# Patient Record
Sex: MALE | Race: WHITE | NOT HISPANIC OR LATINO | Employment: FULL TIME | URBAN - METROPOLITAN AREA
[De-identification: names, ages, dates, MRNs, and addresses within clinical notes are randomized per-mention and may not be internally consistent; named-entity substitution may affect disease eponyms.]

---

## 2017-01-30 ENCOUNTER — OFFICE VISIT (OUTPATIENT)
Dept: PEDIATRICS | Facility: CLINIC | Age: 57
End: 2017-01-30
Payer: COMMERCIAL

## 2017-01-30 VITALS
BODY MASS INDEX: 34.43 KG/M2 | WEIGHT: 227.19 LBS | SYSTOLIC BLOOD PRESSURE: 136 MMHG | HEART RATE: 72 BPM | TEMPERATURE: 97.6 F | OXYGEN SATURATION: 96 % | HEIGHT: 68 IN | RESPIRATION RATE: 12 BRPM | DIASTOLIC BLOOD PRESSURE: 80 MMHG

## 2017-01-30 DIAGNOSIS — H72.91 PERFORATED TYMPANIC MEMBRANE, RIGHT: Primary | ICD-10-CM

## 2017-01-30 DIAGNOSIS — H90.2 CONDUCTIVE HEARING LOSS, UNILATERAL: ICD-10-CM

## 2017-01-30 PROCEDURE — 99213 OFFICE O/P EST LOW 20 MIN: CPT | Performed by: INTERNAL MEDICINE

## 2017-01-30 NOTE — PROGRESS NOTES
"  SUBJECTIVE:                                                    Pavel Greenwood is a 56 year old male who presents to clinic today for the following health issues:      Ear Infection       Duration: x1 1/2 weeks most recent episode but pt states \" has had a perforated ear drum for 20 year\"     Description (location/character/radiation): Right Ear     Intensity:  mild    Accompanying signs and symptoms: None     History (similar episodes/previous evaluation): Yes - pt went to [facility] clinic x1 week ago suggested he should come in.     Precipitating or alleviating factors: None    Therapies tried and outcome: None       Ace comes in for follow-up. He was seen in the [facility] about 2 weeks ago and diagnosed with a R ear infection and started on antibiotics. He was told to follow-up with his PCP because his TM was noted to be ruptured. 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 He elected to use a hearing aid but is wondering if there are other options available.    Problem list and histories reviewed & adjusted, as indicated.  Additional history: as documented    Problem list, Medication list, Allergies, and Medical/Social/Surgical histories reviewed in [facility] and updated as appropriate.    ROS:  Constitutional, HEENT systems are negative, except as otherwise noted.    OBJECTIVE:                                                    /80 mmHg  Pulse 72  Temp(Src) 97.6  F (36.4  C) (Oral)  Resp 12  Ht 5' 8\" (1.727 m)  Wt 227 lb 3 oz (103.052 kg)  BMI 34.55 kg/m2  SpO2 96%  Body mass index is 34.55 kg/(m^2).  GENERAL: healthy, alert and no distress  HENT: normal cephalic/atraumatic, right ear: occluded with debris, some of this was removed with llit ear speculum but not able to fully visualize TM.      ASSESSMENT/PLAN:                                                  "     1. Perforated tympanic membrane, right  Appears to have significant drainage from R middle ear, consistent with TM perforation and likely resolving infection. Will refer patient to ENT to determine next best treatment options.   - OTOLARYNGOLOGY REFERRAL    2. Conductive hearing loss, unilateral  As above.   - OTOLARYNGOLOGY REFERRAL    Patient Instructions   Follow-up with Dr. Sandhu to discuss further management and if surgery would be a better option.        Janet Mueller MD  [facility]

## 2017-01-30 NOTE — NURSING NOTE
"Chief Complaint   Patient presents with     Ear Problem       Initial /80 mmHg  Pulse 72  Temp(Src) 97.6  F (36.4  C) (Oral)  Resp 12  Ht 5' 8\" (1.727 m)  Wt 227 lb 3 oz (103.052 kg)  BMI 34.55 kg/m2  SpO2 96% Estimated body mass index is 34.55 kg/(m^2) as calculated from the following:    Height as of this encounter: 5' 8\" (1.727 m).    Weight as of this encounter: 227 lb 3 oz (103.052 kg).  BP completed using cuff size: mike Cramer MA 1/30/2017 1:15 PM    "

## 2017-01-30 NOTE — MR AVS SNAPSHOT
After Visit Summary   1/30/2017    Pavel Greenwood    MRN: [id number]           Patient Information     Date Of Birth          1960        Visit Information        Provider Department      1/30/2017 1:30 PM Janet Mayorga MD [facility] [place]        Today's Diagnoses     Perforated tympanic membrane, right    -  1     Conductive hearing loss, unilateral           Care Instructions    Follow-up with Dr. Sandhu to discuss further management and if surgery would be a better option.        Follow-ups after your visit        Additional Services     OTOLARYNGOLOGY REFERRAL       Your provider has referred you to: [facility]: [facility] [phone]   [email or web address]    Please be aware that coverage of these services is subject to the terms and limitations of your health insurance plan.  Call member services at your health plan with any benefit or coverage questions.      Please bring the following with you to your appointment:    (1) Any X-Rays, CTs or MRIs which have been performed.  Contact the facility where they were done to arrange for  prior to your scheduled appointment.   (2) List of current medications  (3) This referral request   (4) Any documents/labs given to you for this referral                  Who to contact     If you have questions or need follow up information about today's clinic visit or your schedule please contact [facility] directly at [phone].  Normal or non-critical lab and imaging results will be communicated to you by MyChart, letter or phone within 4 business days after the clinic has received the results. If you do not hear from us within 7 days, please contact the clinic through MyChart or phone. If you have a critical or abnormal lab result, we will notify you by phone as soon as possible.  Submit refill requests through [employer] or call your pharmacy and they will forward the refill request to us.  "Please allow 3 business days for your refill to be completed.          Additional Information About Your Visit        [employer] Information     OnePIN gives you secure access to your electronic health record. If you see a primary care provider, you can also send messages to your care team and make appointments. If you have questions, please call your primary care clinic.  If you do not have a primary care provider, please call [phone] and they will assist you.        Care EveryWhere ID     This is your Care EveryWhere ID. This could be used by other organizations to access your [place] medical records  [id number]        Your Vitals Were     Pulse Temperature Respirations Height BMI (Body Mass Index) Pulse Oximetry    72 97.6  F (36.4  C) (Oral) 12 5' 8\" (1.727 m) 34.55 kg/m2 96%       Blood Pressure from Last 3 Encounters:   01/30/17 136/80   11/15/16 120/84   09/07/16 124/88    Weight from Last 3 Encounters:   01/30/17 227 lb 3 oz (103.052 kg)   11/15/16 219 lb (99.338 kg)   09/07/16 212 lb (96.163 kg)              We Performed the Following     OTOLARYNGOLOGY REFERRAL        Primary Care Provider Office Phone # Fax #    Janet Mayorga [phone]       [facility] [address] [place] [address]        Thank you!     Thank you for choosing [facility]  for your care. Our goal is always to provide you with excellent care. Hearing back from our patients is one way we can continue to improve our services. Please take a few minutes to complete the written survey that you may receive in the mail after your visit with us. Thank you!             Your Updated Medication List - Protect others around you: Learn how to safely use, store and throw away your medicines at www.disposemymeds.org.          This list is accurate as of: 1/30/17  1:48 PM.  Always use your most recent med list.                   Brand Name Dispense Instructions for use    colchicine 0.6 MG tablet    " COLCRYS    30 tablet    Take 2 tablets at the first sign of flare, take 1 additional tablet one hour later.

## 2017-11-10 ENCOUNTER — OFFICE VISIT (OUTPATIENT)
Dept: URGENT CARE | Facility: URGENT CARE | Age: 57
End: 2017-11-10
Payer: COMMERCIAL

## 2017-11-10 VITALS
WEIGHT: 232 LBS | SYSTOLIC BLOOD PRESSURE: 132 MMHG | HEART RATE: 64 BPM | RESPIRATION RATE: 12 BRPM | BODY MASS INDEX: 35.28 KG/M2 | TEMPERATURE: 97.6 F | DIASTOLIC BLOOD PRESSURE: 80 MMHG | OXYGEN SATURATION: 95 %

## 2017-11-10 DIAGNOSIS — M10.9 ACUTE GOUTY ARTHROPATHY: Primary | ICD-10-CM

## 2017-11-10 PROCEDURE — 99213 OFFICE O/P EST LOW 20 MIN: CPT | Performed by: FAMILY MEDICINE

## 2017-11-10 RX ORDER — INDOMETHACIN 25 MG/1
25 CAPSULE ORAL 2 TIMES DAILY WITH MEALS
Qty: 42 CAPSULE | Refills: 1 | Status: SHIPPED | OUTPATIENT
Start: 2017-11-10 | End: 2018-08-18

## 2017-11-10 RX ORDER — ALLOPURINOL 100 MG/1
100 TABLET ORAL DAILY
Qty: 30 TABLET | Refills: 1 | Status: SHIPPED | OUTPATIENT
Start: 2017-11-10 | End: 2018-04-26

## 2017-11-10 NOTE — PROGRESS NOTES
SUBJECTIVE:  Pavel Greenwood, a 57 year old male scheduled an appointment to discuss the following issues:    Acute gouty arthropathy; previous history of gouty arthritis. States he's been on allopurinol in the past.    This episode over the last several days hard to work yesterday he is on his feet most of the day. He works as a .    No injury no fever no chills    Medical, social, surgical, and family histories reviewed.    ROS:  C: NEGATIVE for fever, chills  E: NEGATIVE for vision changes   R: NEGATIVE for significant cough or SOB  CV: NEGATIVE for chest pain, palpitations   GI: NEGATIVE for nausea, abdominal pain, heartburn, or change in bowel habits  : NEGATIVE for frequency, dysuria, or hematuria  MUSCULOSKELETAL: As above  N: NEGATIVE for weakness, dizziness or paresthesias or headache    OBJECTIVE:  /80 (BP Location: Right arm, Patient Position: Sitting, Cuff Size: Adult Regular)  Pulse 64  Temp 97.6  F (36.4  C) (Oral)  Resp 12  Wt 232 lb (105.2 kg)  SpO2 95%  BMI 35.28 kg/m2  EXAM:  GENERAL APPEARANCE: alert and mild distress  EYES: EOMI,  PERRL  HENT: ear canals and TM's normal and nose and mouth without ulcers or lesions  RESP: lungs clear to auscultation - no rales, rhonchi or wheezes  CV: regular rates and rhythm, normal S1 S2, no S3 or S4 and no murmur, click or rub -  ABDOMEN:  soft, nontender, no HSM or masses and bowel sounds normal  MS: Left great toe erythematous at the MTP. Tender to palpation and there is decreased range of motion secondary to pain.    ASSESSMENT/PLAN:  (M10.9) Acute gouty arthropathy  (primary encounter diagnosis)  Comment: This appears to be an acute onset of his gout. He has been on allopurinol but we discussed that this medication is usually a long-term type medication. I will put him back on his allopurinol at a low-dose and he can follow-up with his primary care.    In addition we discussed indomethacin and we'll place him on indomethacin.  Also discussed prednisone which has been used for gout no pain pills dispensed. He will take today off elevated ice and take his medication  Plan: indomethacin (INDOCIN) 25 MG capsule,         allopurinol (ZYLOPRIM) 100 MG tablet

## 2017-11-10 NOTE — MR AVS SNAPSHOT
After Visit Summary   11/10/2017    Pavel Greenwood    MRN: [id number]           Patient Information     Date Of Birth          1960        Visit Information        Provider Department      11/10/2017 9:15 AM Nirav Eisenberg MD [facility]        Today's Diagnoses     Acute gouty arthropathy    -  1       Follow-ups after your visit        Who to contact     If you have questions or need follow up information about today's clinic visit or your schedule please contact [facility] directly at [phone].  Normal or non-critical lab and imaging results will be communicated to you by CEDUhart, letter or phone within 4 business days after the clinic has received the results. If you do not hear from us within 7 days, please contact the clinic through "[employer]t or phone. If you have a critical or abnormal lab result, we will notify you by phone as soon as possible.  Submit refill requests through SmashChart or call your pharmacy and they will forward the refill request to us. Please allow 3 business days for your refill to be completed.          Additional Information About Your Visit        MyChart Information     SmashChart gives you secure access to your electronic health record. If you see a primary care provider, you can also send messages to your care team and make appointments. If you have questions, please call your primary care clinic.  If you do not have a primary care provider, please call [phone] and they will assist you.        Care EveryWhere ID     This is your Care EveryWhere ID. This could be used by other organizations to access your [place] medical records  [id number]        Your Vitals Were     Pulse Temperature Respirations Pulse Oximetry BMI (Body Mass Index)       64 97.6  F (36.4  C) (Oral) 12 95% 35.28 kg/m2        Blood Pressure from Last 3 Encounters:   11/10/17 132/80   01/30/17 136/80   11/15/16 120/84    Weight from Last 3 Encounters:   11/10/17  232 lb (105.2 kg)   01/30/17 227 lb 3 oz (103.1 kg)   11/15/16 219 lb (99.3 kg)              Today, you had the following     No orders found for display         Today's Medication Changes          These changes are accurate as of: 11/10/17  9:36 AM.  If you have any questions, ask your nurse or doctor.               Start taking these medicines.        Dose/Directions    allopurinol 100 MG tablet   Commonly known as:  ZYLOPRIM   Used for:  Acute gouty arthropathy   Started by:  Nirav Eisenberg MD        Dose:  100 mg   Take 1 tablet (100 mg) by mouth daily   Quantity:  30 tablet   Refills:  1       indomethacin 25 MG capsule   Commonly known as:  INDOCIN   Used for:  Acute gouty arthropathy   Started by:  Nirav Eisenberg MD        Dose:  25 mg   Take 1 capsule (25 mg) by mouth 2 times daily (with meals)   Quantity:  42 capsule   Refills:  1            Where to get your medicines      These medications were sent to [facility] - [address]   [address], [place] [address]     Phone:  [phone]     allopurinol 100 MG tablet    indomethacin 25 MG capsule                Primary Care Provider Office Phone # Fax #    Janet Mayorga [phone]       [facility] [place] [address]        Equal Access to Services     [facility]: Hadii richard ku hadasho Soomaali, waaxda luqadaha, qaybta kaalmada adeegyada, atif garvinin hayemilie parra . So [facility] [phone].    ATENCIÓN: Si habla español, tiene a cooper disposición servicios gratuitos de asistencia lingüística. Llame al [phone].    We comply with applicable federal civil rights laws and [place] laws. We do not discriminate on the basis of race, color, national origin, age, disability, sex, sexual orientation, or gender identity.            Thank you!     [facility]  for your care. Our goal is always to provide you with  excellent care. Hearing back from our patients is one way we can continue to improve our services. Please take a few minutes to complete the written survey that you may receive in the mail after your visit with us. Thank you!             Your Updated Medication List - Protect others around you: Learn how to safely use, store and throw away your medicines at www.disposemymeds.org.          This list is accurate as of: 11/10/17  9:36 AM.  Always use your most recent med list.                   Brand Name Dispense Instructions for use Diagnosis    allopurinol 100 MG tablet    ZYLOPRIM    30 tablet    Take 1 tablet (100 mg) by mouth daily    Acute gouty arthropathy       colchicine 0.6 MG tablet    COLCRYS    30 tablet    Take 2 tablets at the first sign of flare, take 1 additional tablet one hour later.    Idiopathic gout involving toe, unspecified chronicity, unspecified laterality       indomethacin 25 MG capsule    INDOCIN    42 capsule    Take 1 capsule (25 mg) by mouth 2 times daily (with meals)    Acute gouty arthropathy

## 2017-11-10 NOTE — NURSING NOTE
"Chief Complaint   Patient presents with     Urgent Care     Arthritis     Flare up of gout in left foot.  Pain started about 2 dys ago and wasnt sure what it was till last night.     Initial /80 (BP Location: Right arm, Patient Position: Sitting, Cuff Size: Adult Regular)  Pulse 64  Temp 97.6  F (36.4  C) (Oral)  Resp 12  Wt 232 lb (105.2 kg)  SpO2 95%  BMI 35.28 kg/m2 Estimated body mass index is 35.28 kg/(m^2) as calculated from the following:    Height as of 1/30/17: 5' 8\" (1.727 m).    Weight as of this encounter: 232 lb (105.2 kg)..  BP completed using cuff size: regular  Nicole Garcia R.N.    "

## 2018-04-26 ENCOUNTER — OFFICE VISIT (OUTPATIENT)
Dept: PEDIATRICS | Facility: CLINIC | Age: 58
End: 2018-04-26
Payer: COMMERCIAL

## 2018-04-26 VITALS
OXYGEN SATURATION: 96 % | BODY MASS INDEX: 34.14 KG/M2 | HEIGHT: 69 IN | DIASTOLIC BLOOD PRESSURE: 76 MMHG | WEIGHT: 230.5 LBS | TEMPERATURE: 98.1 F | HEART RATE: 68 BPM | SYSTOLIC BLOOD PRESSURE: 132 MMHG

## 2018-04-26 DIAGNOSIS — Z86.69 HISTORY OF RECURRENT EAR INFECTION: ICD-10-CM

## 2018-04-26 DIAGNOSIS — H72.91 PERFORATION OF RIGHT TYMPANIC MEMBRANE: ICD-10-CM

## 2018-04-26 DIAGNOSIS — M10.071 ACUTE IDIOPATHIC GOUT INVOLVING TOE OF RIGHT FOOT: Primary | ICD-10-CM

## 2018-04-26 PROCEDURE — 99214 OFFICE O/P EST MOD 30 MIN: CPT | Performed by: INTERNAL MEDICINE

## 2018-04-26 RX ORDER — ALLOPURINOL 100 MG/1
100 TABLET ORAL DAILY
Qty: 90 TABLET | Refills: 1 | Status: SHIPPED | OUTPATIENT
Start: 2018-04-26 | End: 2018-06-13

## 2018-04-26 NOTE — PATIENT INSTRUCTIONS
Gout:  -- make sure this flare is completely gone. Wait two weeks after you are done taking medications  -- then start allopurinol once daily. Please start colchine with this once daily as well.  -- one week after starting, come in for labs. If uric acid is still high then we will increase to 200mg, and can increase as needed to a goal of <6 for uric acid.   -- continue colchicine daily for about 1 month to prevent flare  -- look into dietary adjustments    Follow-up with ENT.

## 2018-04-26 NOTE — MR AVS SNAPSHOT
After Visit Summary   4/26/2018    Pavel Greenwood    MRN: [id number]           Patient Information     Date Of Birth          1960        Visit Information        Provider Department      4/26/2018 1:10 PM Janet Mayorga MD [facility]        Today's Diagnoses     Acute idiopathic gout involving toe of right foot    -  1    History of recurrent ear infection        Perforation of right tympanic membrane          Care Instructions    Gout:  -- make sure this flare is completely gone. Wait two weeks after you are done taking medications  -- then start allopurinol once daily. Please start colchine with this once daily as well.  -- one week after starting, come in for labs. If uric acid is still high then we will increase to 200mg, and can increase as needed to a goal of <6 for uric acid.   -- continue colchicine daily for about 1 month to prevent flare  -- look into dietary adjustments    Follow-up with ENT.           Follow-ups after your visit        Additional Services     OTOLARYNGOLOGY REFERRAL       Your provider has referred you to: [facility]: [place] [facility] [phone]   [email or web address]    Please be aware that coverage of these services is subject to the terms and limitations of your health insurance plan.  Call member services at your health plan with any benefit or coverage questions.      Please bring the following with you to your appointment:    (1) Any X-Rays, CTs or MRIs which have been performed.  Contact the facility where they were done to arrange for  prior to your scheduled appointment.   (2) List of current medications  (3) This referral request   (4) Any documents/labs given to you for this referral                  Future tests that were ordered for you today     Open Future Orders        Priority Expected Expires Ordered    **Comprehensive metabolic panel FUTURE anytime Routine 4/26/2018 4/26/2019 4/26/2018    Uric  "acid Routine  7/27/2018 4/26/2018    Lipid panel reflex to direct LDL Fasting Routine  7/27/2018 4/26/2018    **CBC with platelets FUTURE 2mo Routine 6/25/2018 8/24/2018 4/26/2018            Who to contact     If you have questions or need follow up information about today's clinic visit or your schedule please contact [facility] directly at [phone].  Normal or non-critical lab and imaging results will be communicated to you by [employer], letter or phone within 4 business days after the clinic has received the results. If you do not hear from us within 7 days, please contact the clinic through [employer] or phone. If you have a critical or abnormal lab result, we will notify you by phone as soon as possible.  Submit refill requests through [employer] or call your pharmacy and they will forward the refill request to us. Please allow 3 business days for your refill to be completed.          Additional Information About Your Visit        [employer] Information     [employer] gives you secure access to your electronic health record. If you see a primary care provider, you can also send messages to your care team and make appointments. If you have questions, please call your primary care clinic.  If you do not have a primary care provider, please call [phone] and they will assist you.        Care EveryWhere ID     This is your Care EveryWhere ID. This could be used by other organizations to access your [place] medical records  [id number]        Your Vitals Were     Pulse Temperature Height Pulse Oximetry BMI (Body Mass Index)       68 98.1  F (36.7  C) (Oral) 5' 9\" (1.753 m) 96% 34.04 kg/m2        Blood Pressure from Last 3 Encounters:   04/26/18 132/76   11/10/17 132/80   01/30/17 136/80    Weight from Last 3 Encounters:   04/26/18 230 lb 8 oz (104.6 kg)   11/10/17 232 lb (105.2 kg)   01/30/17 227 lb 3 oz (103.1 kg)              We Performed the Following     OTOLARYNGOLOGY REFERRAL          Where to get your " medicines      These medications were sent to [facility] [place] - [place] - [address]   [address], [place] [address]     Phone:  [phone]     allopurinol 100 MG tablet          Primary Care Provider Office Phone # Fax #    Janet Mayorga [phone]       [address] [place] [address]        Equal Access to Services     [facility]: Hadii aad ku hadasho Soomaali, waaxda luqadaha, qaybta kaalmada adeegyada, waxay idiin hayaan adeeg kharash la'aan ah. So [facility] [phone].    ATENCIÓN: Si guy simmons, tiene a cooper disposición servicios gratuitos de asistencia lingüística. Llame al [phone].    We comply with applicable federal civil rights laws and [place] laws. We do not discriminate on the basis of race, color, national origin, age, disability, sex, sexual orientation, or gender identity.            Thank you!     Thank you for choosing [facility]  for your care. Our goal is always to provide you with excellent care. Hearing back from our patients is one way we can continue to improve our services. Please take a few minutes to complete the written survey that you may receive in the mail after your visit with us. Thank you!             Your Updated Medication List - Protect others around you: Learn how to safely use, store and throw away your medicines at www.disposemymeds.org.          This list is accurate as of 4/26/18  1:35 PM.  Always use your most recent med list.                   Brand Name Dispense Instructions for use Diagnosis    allopurinol 100 MG tablet    ZYLOPRIM    90 tablet    Take 1 tablet (100 mg) by mouth daily    Acute idiopathic gout involving toe of right foot       CEFDINIR PO           colchicine 0.6 MG tablet    COLCRYS    30 tablet    Take 2 tablets at the first sign of flare, take 1 additional tablet one hour later.    Idiopathic gout involving toe, unspecified chronicity,  unspecified laterality       indomethacin 25 MG capsule    INDOCIN    42 capsule    Take 1 capsule (25 mg) by mouth 2 times daily (with meals)    Acute gouty arthropathy

## 2018-04-26 NOTE — PROGRESS NOTES
SUBJECTIVE:   Pavel Greenwood is a 58 year old male who presents to clinic today for the following health issues:      Medication Followup of Indomethacin     Taking Medication as prescribed: yes    Side Effects:  None    Medication Helping Symptoms:  yes    Medication Followup of colchicine     Taking Medication as prescribed: yes    Side Effects:  None    Medication Helping Symptoms:  yes     Ace comes in to discuss gout management. Recently had a flare-up in his R great toe; reports that he gets flares about every 6 months. Has been treating with colchicine initially and subsequently with indomethacin. Flare is almost resolved. He is interested in discussing a chronic medication to reduce his risk of flares in the future. Works as a , so needs to be on his feet. Hard to change his diet.    Would also like a referral to ENT. Continues to get recurrent ear infections, and has a hx of rupture of his R TM. Never followed up with them previously.   Had a gout flare up recently, almost over. Not still taking allopurinol.     Problem list and histories reviewed & adjusted, as indicated.  Additional history: as documented    Patient Active Problem List   Diagnosis     CARDIOVASCULAR SCREENING; LDL GOAL LESS THAN 160     Adenomatous polyp of colon tubovillous- repeat cscope 2016     Idiopathic gout involving toe, unspecified chronicity, unspecified laterality     Past Surgical History:   Procedure Laterality Date     COLONOSCOPY       MYRINGOTOMY BILATERAL       TONSILLECTOMY         Social History   Substance Use Topics     Smoking status: Former Smoker     Quit date: 5/11/2008     Smokeless tobacco: Never Used     Alcohol use 4.8 oz/week     8 Standard drinks or equivalent per week      Comment: 6-7 drinks per week      Family History   Problem Relation Age of Onset     Anxiety Disorder Paternal Grandmother      CEREBROVASCULAR DISEASE Brother      small stroke     Other Cancer Maternal Grandfather   "    lung CA r/t smoking           Reviewed and updated as needed this visit by clinical staff  Tobacco  Allergies  Meds  Med Hx  Fam Hx  Soc Hx        ROS:  Constitutional, HEENT, MSK systems are negative, except as otherwise noted.    OBJECTIVE:     /76 (BP Location: Right arm, Patient Position: Chair, Cuff Size: Adult Large)  Pulse 68  Temp 98.1  F (36.7  C) (Oral)  Ht 5' 9\" (1.753 m)  Wt 230 lb 8 oz (104.6 kg)  SpO2 96%  BMI 34.04 kg/m2  Body mass index is 34.04 kg/(m^2).  GENERAL: healthy, alert and no distress  HENT: R TM with significant scarring and retraction. Likely effusion present but no clear infection. L TM with some scarring, effusion present.   MS: minimal swelling and tenderness at R MTP joint.     Diagnostic Test Results:  none     ASSESSMENT/PLAN:     1. Acute idiopathic gout involving toe of right foot  Acute flare appears to be resolving well with conservative management with indomethacin. Discussed having him taper off this as able, he is interested in restarting allopurinol in future. He will wait a minimum of 2 weeks after flare resolves, then will start allopurinol with colchicine ppx. Will obtain labs 1 week after starting allopurinol and can titrate up as needed to goal uric acid of <6. Reviewed medication side effects.   - allopurinol (ZYLOPRIM) 100 MG tablet; Take 1 tablet (100 mg) by mouth daily  Dispense: 90 tablet; Refill: 1  - Lipid panel reflex to direct LDL Fasting; Future  - **CBC with platelets FUTURE 2mo; Future  - **Comprehensive metabolic panel FUTURE anytime; Future  - Uric acid; Future    2. History of recurrent ear infection  Continues to have recurrent otitis media of R ear, some hearing issues. Will refer to ENT for further evaluation.   - OTOLARYNGOLOGY REFERRAL    3. Perforation of right tympanic membrane  As above.       Patient Instructions   Gout:  -- make sure this flare is completely gone. Wait two weeks after you are done taking medications  -- " then start allopurinol once daily. Please start colchine with this once daily as well.  -- one week after starting, come in for labs. If uric acid is still high then we will increase to 200mg, and can increase as needed to a goal of <6 for uric acid.   -- continue colchicine daily for about 1 month to prevent flare  -- look into dietary adjustments    Follow-up with ENT.       Janet Mueller MD  [facility]

## 2018-05-22 ENCOUNTER — MYC REFILL (OUTPATIENT)
Dept: PEDIATRICS | Facility: CLINIC | Age: 58
End: 2018-05-22

## 2018-05-22 DIAGNOSIS — M10.079 IDIOPATHIC GOUT INVOLVING TOE, UNSPECIFIED CHRONICITY, UNSPECIFIED LATERALITY: ICD-10-CM

## 2018-05-22 NOTE — TELEPHONE ENCOUNTER
"Unable to fill per standing order routed to Dr. Mayorga for approval.      Requested Prescriptions   Pending Prescriptions Disp Refills     colchicine (COLCRYS) 0.6 MG tablet 30 tablet 0    Last Written Prescription Date:  8/23/16  Last Fill Quantity: 30,  # refills: 0   Last office visit: 4/26/2018 with prescribing provider   Sig: Take 2 tablets at the first sign of flare, take 1 additional tablet one hour later.    Gout Agents Protocol Failed    5/22/2018  5:59 PM       Failed - CBC on file in past 12 months    Recent Labs   Lab Test  05/02/16   1007   WBC  5.2   RBC  4.70   HGB  14.7   HCT  41.7   PLT  267            Failed - ALT on file in past 12 months    Recent Labs   Lab Test  05/02/16   1007   ALT  23            Failed - Uric acid greater than or equal to 6 on file in past 12 months    Recent Labs   Lab Test  05/02/16   1007   URIC  7.1     If level is 6mg/dL or greater, ok to refill one time and refer to provider.          Failed - Normal serum creatinine on file in the past 12 months    Recent Labs   Lab Test  05/02/16   1007   CR  0.76            Passed - Recent (12 mo) or future (30 days) visit within the authorizing provider's specialty    Patient had office visit in the last 12 months or has a visit in the next 30 days with authorizing provider or within the authorizing provider's specialty.  See \"Patient Info\" tab in inbasket, or \"Choose Columns\" in Meds & Orders section of the refill encounter.           Passed - Patient is age 18 or older          "

## 2018-05-23 RX ORDER — COLCHICINE 0.6 MG/1
TABLET ORAL
Qty: 30 TABLET | Refills: 0 | Status: SHIPPED | OUTPATIENT
Start: 2018-05-23 | End: 2018-06-18

## 2018-06-11 DIAGNOSIS — M10.071 ACUTE IDIOPATHIC GOUT INVOLVING TOE OF RIGHT FOOT: ICD-10-CM

## 2018-06-11 LAB
ERYTHROCYTE [DISTWIDTH] IN BLOOD BY AUTOMATED COUNT: 12.6 % (ref 10–15)
HCT VFR BLD AUTO: 42.5 % (ref 40–53)
HGB BLD-MCNC: 15 G/DL (ref 13.3–17.7)
MCH RBC QN AUTO: 31 PG (ref 26.5–33)
MCHC RBC AUTO-ENTMCNC: 35.3 G/DL (ref 31.5–36.5)
MCV RBC AUTO: 88 FL (ref 78–100)
PLATELET # BLD AUTO: 226 10E9/L (ref 150–450)
RBC # BLD AUTO: 4.84 10E12/L (ref 4.4–5.9)
WBC # BLD AUTO: 5.4 10E9/L (ref 4–11)

## 2018-06-11 PROCEDURE — 84550 ASSAY OF BLOOD/URIC ACID: CPT | Performed by: INTERNAL MEDICINE

## 2018-06-11 PROCEDURE — 80053 COMPREHEN METABOLIC PANEL: CPT | Performed by: INTERNAL MEDICINE

## 2018-06-11 PROCEDURE — 36415 COLL VENOUS BLD VENIPUNCTURE: CPT | Performed by: INTERNAL MEDICINE

## 2018-06-11 PROCEDURE — 80061 LIPID PANEL: CPT | Performed by: INTERNAL MEDICINE

## 2018-06-11 PROCEDURE — 85027 COMPLETE CBC AUTOMATED: CPT | Performed by: INTERNAL MEDICINE

## 2018-06-13 DIAGNOSIS — M10.079 IDIOPATHIC GOUT INVOLVING TOE, UNSPECIFIED CHRONICITY, UNSPECIFIED LATERALITY: Primary | ICD-10-CM

## 2018-06-13 DIAGNOSIS — M10.071 ACUTE IDIOPATHIC GOUT INVOLVING TOE OF RIGHT FOOT: ICD-10-CM

## 2018-06-13 LAB
ALBUMIN SERPL-MCNC: 4.1 G/DL (ref 3.4–5)
ALP SERPL-CCNC: 61 U/L (ref 40–150)
ALT SERPL W P-5'-P-CCNC: 32 U/L (ref 0–70)
ANION GAP SERPL CALCULATED.3IONS-SCNC: 11 MMOL/L (ref 3–14)
AST SERPL W P-5'-P-CCNC: 18 U/L (ref 0–45)
BILIRUB SERPL-MCNC: 0.6 MG/DL (ref 0.2–1.3)
BUN SERPL-MCNC: 18 MG/DL (ref 7–30)
CALCIUM SERPL-MCNC: 9.4 MG/DL (ref 8.5–10.1)
CHLORIDE SERPL-SCNC: 106 MMOL/L (ref 94–109)
CHOLEST SERPL-MCNC: 140 MG/DL
CO2 SERPL-SCNC: 24 MMOL/L (ref 20–32)
CREAT SERPL-MCNC: 0.75 MG/DL (ref 0.66–1.25)
GFR SERPL CREATININE-BSD FRML MDRD: >90 ML/MIN/1.7M2
GLUCOSE SERPL-MCNC: 95 MG/DL (ref 70–99)
HDLC SERPL-MCNC: 41 MG/DL
LDLC SERPL CALC-MCNC: 66 MG/DL
NONHDLC SERPL-MCNC: 99 MG/DL
POTASSIUM SERPL-SCNC: 4.2 MMOL/L (ref 3.4–5.3)
PROT SERPL-MCNC: 7.2 G/DL (ref 6.8–8.8)
SODIUM SERPL-SCNC: 141 MMOL/L (ref 133–144)
TRIGL SERPL-MCNC: 164 MG/DL
URATE SERPL-MCNC: 7.9 MG/DL (ref 3.5–7.2)

## 2018-06-13 RX ORDER — ALLOPURINOL 100 MG/1
200 TABLET ORAL DAILY
Qty: 90 TABLET | Refills: 1 | COMMUNITY
Start: 2018-06-13 | End: 2018-07-10

## 2018-06-18 DIAGNOSIS — M10.079 IDIOPATHIC GOUT INVOLVING TOE, UNSPECIFIED CHRONICITY, UNSPECIFIED LATERALITY: ICD-10-CM

## 2018-06-19 NOTE — TELEPHONE ENCOUNTER
"Requested Prescriptions   Pending Prescriptions Disp Refills     COLCRYS 0.6 MG tablet [Pharmacy Med Name: COLCRYS 0.6MG TABS]    Last Written Prescription Date:  5/23/2018  Last Fill Quantity: 30,  # refills: 0   Last office visit: 4/26/2018 with prescribing provider:  Janet Mayorga     Future Office Visit:     30 tablet 0     Sig: TAKE TWO TABLETS BY MOUTH AT THE FIRST SIGN OF FLARE, TAKE ONE ADDITIONAL TABLET ONE HOUR LATER    Gout Agents Protocol Failed    6/18/2018  3:13 PM       Failed - Has Uric Acid on file in past 12 months and value is less than 6    Recent Labs   Lab Test  06/11/18   1451   URIC  7.9*     If level is 6mg/dL or greater, ok to refill one time and refer to provider.          Passed - CBC on file in past 12 months    Recent Labs   Lab Test  06/11/18   1451   WBC  5.4   RBC  4.84   HGB  15.0   HCT  42.5   PLT  226       For [facility] ONLY: [id number] = WBC, [id number] = RBC         Passed - ALT on file in past 12 months    Recent Labs   Lab Test  06/11/18   1451   ALT  32            Passed - Recent (12 mo) or future (30 days) visit within the authorizing provider's specialty    Patient had office visit in the last 12 months or has a visit in the next 30 days with authorizing provider or within the authorizing provider's specialty.  See \"Patient Info\" tab in inbasket, or \"Choose Columns\" in Meds & Orders section of the refill encounter.           Passed - Patient is age 18 or older       Passed - Normal serum creatinine on file in the past 12 months    Recent Labs   Lab Test  06/11/18   1451   CR  0.75               "

## 2018-06-20 RX ORDER — COLCHICINE 0.6 MG/1
TABLET, FILM COATED ORAL
Qty: 30 TABLET | Refills: 0 | Status: SHIPPED | OUTPATIENT
Start: 2018-06-20 | End: 2018-08-18

## 2018-06-20 NOTE — TELEPHONE ENCOUNTER
Routing refill request to provider for review/approval because:  Labs out of range:  Uric Acid     Please advise if patient should continue taking per 4/26/2018 LOV note    Ginger VALE RN, BSN, PHN  [facility]

## 2018-07-16 DIAGNOSIS — M10.071 ACUTE IDIOPATHIC GOUT INVOLVING TOE OF RIGHT FOOT: ICD-10-CM

## 2018-07-16 DIAGNOSIS — M10.079 IDIOPATHIC GOUT INVOLVING TOE, UNSPECIFIED CHRONICITY, UNSPECIFIED LATERALITY: ICD-10-CM

## 2018-07-16 LAB
ERYTHROCYTE [DISTWIDTH] IN BLOOD BY AUTOMATED COUNT: 13.3 % (ref 10–15)
HCT VFR BLD AUTO: 42.1 % (ref 40–53)
HGB BLD-MCNC: 14.7 G/DL (ref 13.3–17.7)
MCH RBC QN AUTO: 31.1 PG (ref 26.5–33)
MCHC RBC AUTO-ENTMCNC: 34.9 G/DL (ref 31.5–36.5)
MCV RBC AUTO: 89 FL (ref 78–100)
PLATELET # BLD AUTO: 246 10E9/L (ref 150–450)
RBC # BLD AUTO: 4.72 10E12/L (ref 4.4–5.9)
WBC # BLD AUTO: 6 10E9/L (ref 4–11)

## 2018-07-16 PROCEDURE — 36415 COLL VENOUS BLD VENIPUNCTURE: CPT | Performed by: INTERNAL MEDICINE

## 2018-07-16 PROCEDURE — 80053 COMPREHEN METABOLIC PANEL: CPT | Performed by: INTERNAL MEDICINE

## 2018-07-16 PROCEDURE — 85027 COMPLETE CBC AUTOMATED: CPT | Performed by: INTERNAL MEDICINE

## 2018-07-16 PROCEDURE — 84550 ASSAY OF BLOOD/URIC ACID: CPT | Performed by: INTERNAL MEDICINE

## 2018-07-17 DIAGNOSIS — M10.079 IDIOPATHIC GOUT INVOLVING TOE, UNSPECIFIED CHRONICITY, UNSPECIFIED LATERALITY: Primary | ICD-10-CM

## 2018-07-17 LAB
ALBUMIN SERPL-MCNC: 3.9 G/DL (ref 3.4–5)
ALP SERPL-CCNC: 69 U/L (ref 40–150)
ALT SERPL W P-5'-P-CCNC: 38 U/L (ref 0–70)
ANION GAP SERPL CALCULATED.3IONS-SCNC: 10 MMOL/L (ref 3–14)
AST SERPL W P-5'-P-CCNC: 14 U/L (ref 0–45)
BILIRUB SERPL-MCNC: 0.5 MG/DL (ref 0.2–1.3)
BUN SERPL-MCNC: 19 MG/DL (ref 7–30)
CALCIUM SERPL-MCNC: 9.2 MG/DL (ref 8.5–10.1)
CHLORIDE SERPL-SCNC: 105 MMOL/L (ref 94–109)
CO2 SERPL-SCNC: 25 MMOL/L (ref 20–32)
CREAT SERPL-MCNC: 0.76 MG/DL (ref 0.66–1.25)
GFR SERPL CREATININE-BSD FRML MDRD: >90 ML/MIN/1.7M2
GLUCOSE SERPL-MCNC: 98 MG/DL (ref 70–99)
POTASSIUM SERPL-SCNC: 4.1 MMOL/L (ref 3.4–5.3)
PROT SERPL-MCNC: 7.2 G/DL (ref 6.8–8.8)
SODIUM SERPL-SCNC: 140 MMOL/L (ref 133–144)
URATE SERPL-MCNC: 7.3 MG/DL (ref 3.5–7.2)

## 2018-07-17 RX ORDER — ALLOPURINOL 100 MG/1
TABLET ORAL
Qty: 90 TABLET | Refills: 1 | Status: SHIPPED | OUTPATIENT
Start: 2018-07-17 | End: 2018-08-13

## 2018-08-10 NOTE — PROGRESS NOTES
SUBJECTIVE:   Pavel Greenwood is a 58 year old male who presents to clinic today for the following health issues:      Musculoskeletal problem/pain      Duration: x6 months ago - intermittent     Description  Location: right shoulder     Intensity:  mild    Accompanying signs and symptoms: none    History  Previous similar problem: no   Previous evaluation:  none    Precipitating or alleviating factors:  Trauma or overuse: no   Aggravating factors include: Sleeping on side and lifting over head     Therapies tried and outcome: nothing    Intermittent R shoulder pain for past 6 months. Has been trying to baby his shoulder because of the pain. Pain is worst when he raises his arm, particularly above his head (for exam, putting plates or dishes up high). Worse if he lifts something heavy above his head. Feels it more when he sleeps on his side. Comes and goes. No L shoulder pain. No injury he knows of. Hasn't tried any treatments.     Problem list and histories reviewed & adjusted, as indicated.  Additional history: as documented    Patient Active Problem List   Diagnosis     CARDIOVASCULAR SCREENING; LDL GOAL LESS THAN 160     Adenomatous polyp of colon tubovillous- repeat cscope 2016     Idiopathic gout involving toe, unspecified chronicity, unspecified laterality     Past Surgical History:   Procedure Laterality Date     COLONOSCOPY       MYRINGOTOMY BILATERAL       TONSILLECTOMY         Social History   Substance Use Topics     Smoking status: Former Smoker     Quit date: 5/11/2008     Smokeless tobacco: Never Used     Alcohol use 4.8 oz/week     8 Standard drinks or equivalent per week      Comment: 6-7 drinks per week      Family History   Problem Relation Age of Onset     Anxiety Disorder Paternal Grandmother      Cerebrovascular Disease Brother      small stroke     Other Cancer Maternal Grandfather      lung CA r/t smoking           Reviewed and updated as needed this visit by clinical staff  Tobacco   "Allergies  Meds  Med Hx  Fam Hx  Soc Hx        ROS:  Constitutional, MSK systems are negative, except as otherwise noted.    OBJECTIVE:     /76 (BP Location: Right arm, Patient Position: Chair, Cuff Size: Adult Large)  Pulse 68  Temp 98.2  F (36.8  C) (Tympanic)  Ht 5' 9\" (1.753 m)  Wt 231 lb 9 oz (105 kg)  SpO2 96%  BMI 34.2 kg/m2  Body mass index is 34.2 kg/(m^2).  GENERAL: healthy, alert and no distress  MS: some pain with R shoulder extension. +Neer's test    Diagnostic Test Results:  Shoulder XR: joint space appears to be preserved.     ASSESSMENT/PLAN:     1. Chronic right shoulder pain  Likely due to impingement, supraspinatus tendonitis also possible. Less likely OA, will await radiology read. Will start with physical therapy, ice and gentle stretching. Fine to try NSAIDs occasionally as well.   - XR Shoulder Right G/E 3 Views; Future  - [facility]    2. Shoulder impingement syndrome, right  As above.   - [facility]  - ORTHO  REFERRAL    3. Idiopathic gout involving toe, unspecified chronicity, unspecified laterality  Doing well on allopurinol, no flares for 5 months or so. Due for labs discussed increasing allopurinol dose to max of 600mg daily if clinically doing well.   - allopurinol (ZYLOPRIM) 100 MG tablet; Take 1 tab daily with 300mg for total of 400mg  Dispense: 90 tablet; Refill: 1  - allopurinol (ZYLOPRIM) 300 MG tablet; Take 1 tablet (300 mg) by mouth daily  Dispense: 90 tablet; Refill: 1    Patient Instructions   Your symptoms are most consistent with impingement syndrome (where a swollen bursa compresses the tendons in your rotator cuff).    Start the following:  -- ibuprofen 400-600mg 2-3 times daily WITH FOOD  -- ice regularly  -- physical therapy (can schedule downstairs)  -- follow-up with Orthopedic doctor if not improving for possible injection    Shoulder Impingement Syndrome  The rotator cuff is a group of " muscles and tendons that surround the shoulder joint. These muscles and tendons hold the arm in its joint. They help the shoulder move. The rotator cuff muscles and tendons can become irritated from repeated rubbing against the shoulder bone. This is called shoulder impingement syndrome or rotator cuff tendonitis.     If your case is mild, you may only need to rest the shoulder and then do certain exercises to strengthen the muscles. You can also take anti-inflammatory medicines. Steroid injections into the shoulder can ease inflammation. But you can have only a limited number of these. If the condition gets worse, your shoulder muscles may become thin and weak. This can lead to a rotator cuff tear.  Symptoms of shoulder impingement syndrome may include:    Shoulder pain that gets worse when you raise your arm overhead    Weakness of the shoulder muscles when you use your arm overhead    Popping and clicking when you move your shoulder    Shoulder pain that wakes you up at night, especially when you sleep on the affected shoulder    Sudden pain in your shoulder when you lift or reach  Home care  Follow these tips to take care of yourself at home:    Avoid activities that make your pain worse. These include raising your arms overhead, repeating the same motion over and over, or lifting heavy objects.    Don t hold your arm in one position for a long time. Keep it moving.    Put an ice pack on the sore area for 20 minutes every 1 to 2 hours for the first day. You can make an ice pack by putting ice cubes in a plastic bag. Wrap the bag in a towel before putting it on your shoulder. A frozen bag of peas or something similar can also be used as an ice pack. Use the ice packs 3 to 4 times a day for the next 2 days. Continue using the ice to relieve of pain and swelling as needed.    You may take acetaminophen or ibuprofen to control pain, unless another medicine was prescribed. If prednisone was prescribed, don t take  anti-inflammatory medicines. If you have chronic liver or kidney disease or ever had a stomach ulcer or gastrointestinal bleeding, talk with your doctor before using these medicines.    After your symptoms ease, you may get physical therapy or start a home exercise program. This can strengthen your shoulder muscles and help your range of motion. Talk with your doctor about what is best for your condition.  Follow-up care  Follow up with your healthcare provider, or as advised.  When to seek medical advice  Call your healthcare provider right away if any of these occur:    Shoulder pain that gets worse and wakes you up at night    Your shoulder or arm swells    Numbness, tingling, or pain that travels down the arm to the hand    Loss of shoulder strength    Fever or chills  Date Last Reviewed: 8/1/2016 2000-2017 [employer]. [address] [place] [address], [place] [address]. All rights reserved. This information is not intended as a substitute for professional medical care. Always follow your healthcare professional's instructions.            Janet Mueller MD  [facility]

## 2018-08-13 ENCOUNTER — OFFICE VISIT (OUTPATIENT)
Dept: PEDIATRICS | Facility: CLINIC | Age: 58
End: 2018-08-13
Payer: COMMERCIAL

## 2018-08-13 ENCOUNTER — RADIANT APPOINTMENT (OUTPATIENT)
Dept: GENERAL RADIOLOGY | Facility: CLINIC | Age: 58
End: 2018-08-13
Attending: INTERNAL MEDICINE
Payer: COMMERCIAL

## 2018-08-13 VITALS
SYSTOLIC BLOOD PRESSURE: 120 MMHG | WEIGHT: 231.56 LBS | OXYGEN SATURATION: 96 % | TEMPERATURE: 98.2 F | HEART RATE: 68 BPM | BODY MASS INDEX: 34.3 KG/M2 | HEIGHT: 69 IN | DIASTOLIC BLOOD PRESSURE: 76 MMHG

## 2018-08-13 DIAGNOSIS — M10.079 IDIOPATHIC GOUT INVOLVING TOE, UNSPECIFIED CHRONICITY, UNSPECIFIED LATERALITY: ICD-10-CM

## 2018-08-13 DIAGNOSIS — M25.511 CHRONIC RIGHT SHOULDER PAIN: ICD-10-CM

## 2018-08-13 DIAGNOSIS — G89.29 CHRONIC RIGHT SHOULDER PAIN: Primary | ICD-10-CM

## 2018-08-13 DIAGNOSIS — G89.29 CHRONIC RIGHT SHOULDER PAIN: ICD-10-CM

## 2018-08-13 DIAGNOSIS — M25.511 CHRONIC RIGHT SHOULDER PAIN: Primary | ICD-10-CM

## 2018-08-13 DIAGNOSIS — M75.41 SHOULDER IMPINGEMENT SYNDROME, RIGHT: ICD-10-CM

## 2018-08-13 PROCEDURE — 99213 OFFICE O/P EST LOW 20 MIN: CPT | Performed by: INTERNAL MEDICINE

## 2018-08-13 PROCEDURE — 73030 X-RAY EXAM OF SHOULDER: CPT | Mod: RT

## 2018-08-13 RX ORDER — ALLOPURINOL 100 MG/1
TABLET ORAL
Qty: 90 TABLET | Refills: 1 | Status: SHIPPED | OUTPATIENT
Start: 2018-08-13 | End: 2018-08-30

## 2018-08-13 RX ORDER — ALLOPURINOL 300 MG/1
300 TABLET ORAL DAILY
Qty: 90 TABLET | Refills: 1 | Status: SHIPPED | OUTPATIENT
Start: 2018-08-13 | End: 2019-03-01

## 2018-08-13 NOTE — PATIENT INSTRUCTIONS
Your symptoms are most consistent with impingement syndrome (where a swollen bursa compresses the tendons in your rotator cuff).    Start the following:  -- ibuprofen 400-600mg 2-3 times daily WITH FOOD  -- ice regularly  -- physical therapy (can schedule downstairs)  -- follow-up with Orthopedic doctor if not improving for possible injection    Shoulder Impingement Syndrome  The rotator cuff is a group of muscles and tendons that surround the shoulder joint. These muscles and tendons hold the arm in its joint. They help the shoulder move. The rotator cuff muscles and tendons can become irritated from repeated rubbing against the shoulder bone. This is called shoulder impingement syndrome or rotator cuff tendonitis.     If your case is mild, you may only need to rest the shoulder and then do certain exercises to strengthen the muscles. You can also take anti-inflammatory medicines. Steroid injections into the shoulder can ease inflammation. But you can have only a limited number of these. If the condition gets worse, your shoulder muscles may become thin and weak. This can lead to a rotator cuff tear.  Symptoms of shoulder impingement syndrome may include:    Shoulder pain that gets worse when you raise your arm overhead    Weakness of the shoulder muscles when you use your arm overhead    Popping and clicking when you move your shoulder    Shoulder pain that wakes you up at night, especially when you sleep on the affected shoulder    Sudden pain in your shoulder when you lift or reach  Home care  Follow these tips to take care of yourself at home:    Avoid activities that make your pain worse. These include raising your arms overhead, repeating the same motion over and over, or lifting heavy objects.    Don t hold your arm in one position for a long time. Keep it moving.    Put an ice pack on the sore area for 20 minutes every 1 to 2 hours for the first day. You can make an ice pack by putting ice cubes in a  plastic bag. Wrap the bag in a towel before putting it on your shoulder. A frozen bag of peas or something similar can also be used as an ice pack. Use the ice packs 3 to 4 times a day for the next 2 days. Continue using the ice to relieve of pain and swelling as needed.    You may take acetaminophen or ibuprofen to control pain, unless another medicine was prescribed. If prednisone was prescribed, don t take anti-inflammatory medicines. If you have chronic liver or kidney disease or ever had a stomach ulcer or gastrointestinal bleeding, talk with your doctor before using these medicines.    After your symptoms ease, you may get physical therapy or start a home exercise program. This can strengthen your shoulder muscles and help your range of motion. Talk with your doctor about what is best for your condition.  Follow-up care  Follow up with your healthcare provider, or as advised.  When to seek medical advice  Call your healthcare provider right away if any of these occur:    Shoulder pain that gets worse and wakes you up at night    Your shoulder or arm swells    Numbness, tingling, or pain that travels down the arm to the hand    Loss of shoulder strength    Fever or chills  Date Last Reviewed: 8/1/2016 2000-2017 [employer]. [address] [place] [address], [place] [address]. All rights reserved. This information is not intended as a substitute for professional medical care. Always follow your healthcare professional's instructions.

## 2018-08-13 NOTE — MR AVS SNAPSHOT
After Visit Summary   8/13/2018    Pavel Greenwood    MRN: [id number]           Patient Information     Date Of Birth          1960        Visit Information        Provider Department      8/13/2018 2:10 PM Janet Mayorga MD [facility]        Today's Diagnoses     Chronic right shoulder pain    -  1    Idiopathic gout involving toe, unspecified chronicity, unspecified laterality        Hip impingement syndrome, right          Care Instructions    Your symptoms are most consistent with impingement syndrome (where a swollen bursa compresses the tendons in your rotator cuff).    Start the following:  -- ibuprofen 400-600mg 2-3 times daily WITH FOOD  -- ice regularly  -- physical therapy (can schedule downstairs)  -- follow-up with Orthopedic doctor if not improving for possible injection    Shoulder Impingement Syndrome  The rotator cuff is a group of muscles and tendons that surround the shoulder joint. These muscles and tendons hold the arm in its joint. They help the shoulder move. The rotator cuff muscles and tendons can become irritated from repeated rubbing against the shoulder bone. This is called shoulder impingement syndrome or rotator cuff tendonitis.     If your case is mild, you may only need to rest the shoulder and then do certain exercises to strengthen the muscles. You can also take anti-inflammatory medicines. Steroid injections into the shoulder can ease inflammation. But you can have only a limited number of these. If the condition gets worse, your shoulder muscles may become thin and weak. This can lead to a rotator cuff tear.  Symptoms of shoulder impingement syndrome may include:    Shoulder pain that gets worse when you raise your arm overhead    Weakness of the shoulder muscles when you use your arm overhead    Popping and clicking when you move your shoulder    Shoulder pain that wakes you up at night, especially when you sleep on the affected shoulder    Sudden  pain in your shoulder when you lift or reach  Home care  Follow these tips to take care of yourself at home:    Avoid activities that make your pain worse. These include raising your arms overhead, repeating the same motion over and over, or lifting heavy objects.    Don t hold your arm in one position for a long time. Keep it moving.    Put an ice pack on the sore area for 20 minutes every 1 to 2 hours for the first day. You can make an ice pack by putting ice cubes in a plastic bag. Wrap the bag in a towel before putting it on your shoulder. A frozen bag of peas or something similar can also be used as an ice pack. Use the ice packs 3 to 4 times a day for the next 2 days. Continue using the ice to relieve of pain and swelling as needed.    You may take acetaminophen or ibuprofen to control pain, unless another medicine was prescribed. If prednisone was prescribed, don t take anti-inflammatory medicines. If you have chronic liver or kidney disease or ever had a stomach ulcer or gastrointestinal bleeding, talk with your doctor before using these medicines.    After your symptoms ease, you may get physical therapy or start a home exercise program. This can strengthen your shoulder muscles and help your range of motion. Talk with your doctor about what is best for your condition.  Follow-up care  Follow up with your healthcare provider, or as advised.  When to seek medical advice  Call your healthcare provider right away if any of these occur:    Shoulder pain that gets worse and wakes you up at night    Your shoulder or arm swells    Numbness, tingling, or pain that travels down the arm to the hand    Loss of shoulder strength    Fever or chills  Date Last Reviewed: 8/1/2016 2000-2017 [employer]. [address] [place] [address]. All rights reserved. This information is not intended as a substitute for professional medical care. Always follow your healthcare professional's  instructions.                Follow-ups after your visit        Additional Services     [facility] PT, HAND, AND CHIROPRACTIC REFERRAL       **This order will print in the [facility] Scheduling Office**    Physical Therapy, Hand Therapy and Chiropractic Care are available through:    *[facility]  *[facility]  *[facility]    Call one number to schedule at any of the above locations: [phone].    Your provider has referred you to: Physical Therapy at [facility] or [facility]    Indication/Reason for Referral: Shoulder Pain  Onset of Illness: 6 months  Therapy Orders: Evaluate and Treat  Special Programs: None  Special Request: None    Cynthia Marie      Additional Comments for the Therapist or Chiropractor: impingement syndrome likely dx    Please be aware that coverage of these services is subject to the terms and limitations of your health insurance plan.  Call member services at your health plan with any benefit or coverage questions.      Please bring the following to your appointment:    *Your personal calendar for scheduling future appointments  *Comfortable clothing            ORTHO  REFERRAL       [facility] is referring you to the Orthopedic  Services at [facility].       The  Representative will assist you in the coordination of your Orthopedic and Musculoskeletal Care as prescribed by your physician.    The  Representative will call you within 1 business day to help schedule your appointment, or you may contact the  Representative at:    All areas ~ [phone]     Type of Referral : Non Surgical       Timeframe requested: Routine    Coverage of these services is subject to the terms and limitations of your health insurance plan.  Please call member services at your health plan with any benefit or coverage questions.      If X-rays, CT or MRI's have been performed, please contact the  "facility where they were done to arrange for , prior to your scheduled appointment.  Please bring this referral request to your appointment and present it to your specialist.                  Follow-up notes from your care team     Return in about 4 weeks (around 9/10/2018), or if symptoms worsen or fail to improve.      Who to contact     If you have questions or need follow up information about today's clinic visit or your schedule please contact [facility] [place] directly at [phone].  Normal or non-critical lab and imaging results will be communicated to you by DEM Solutionshart, letter or phone within 4 business days after the clinic has received the results. If you do not hear from us within 7 days, please contact the clinic through Mirriadt or phone. If you have a critical or abnormal lab result, we will notify you by phone as soon as possible.  Submit refill requests through [employer] or call your pharmacy and they will forward the refill request to us. Please allow 3 business days for your refill to be completed.          Additional Information About Your Visit        Tw[employer] Information     [employer] gives you secure access to your electronic health record. If you see a primary care provider, you can also send messages to your care team and make appointments. If you have questions, please call your primary care clinic.  If you do not have a primary care provider, please call [phone] and they will assist you.        Care EveryWhere ID     This is your Care EveryWhere ID. This could be used by other organizations to access your [place] medical records  [id number]        Your Vitals Were     Pulse Temperature Height Pulse Oximetry BMI (Body Mass Index)       68 98.2  F (36.8  C) (Tympanic) 5' 9\" (1.753 m) 96% 34.2 kg/m2        Blood Pressure from Last 3 Encounters:   08/13/18 120/76   04/26/18 132/76   11/10/17 132/80    Weight from Last 3 Encounters:   08/13/18 231 lb 9 oz (105 kg)   04/26/18 230 lb " 8 oz (104.6 kg)   11/10/17 232 lb (105.2 kg)              We Performed the Following     [facility] PT, HAND, AND CHIROPRACTIC REFERRAL     ORTHO  REFERRAL          Where to get your medicines      These medications were sent to [facility] [place] [facility] [place] - [address]   [address] [place] [address] [address]     Phone:  [phone]     allopurinol 100 MG tablet    allopurinol 300 MG tablet          Primary Care Provider Office Phone # Fax #    Janet Mayorga [phone]       [address] [place] [address]        Equal Access to Services     [facility]: Hadii aad almas hadasho Socorky, waaxda luqadaha, qaybta kaalmada adeegyada, atif parra . So [facility] [phone].    ATENCIÓN: Si habla español, tiene a cooper disposición servicios gratuitos de asistencia lingüística. [facility] [phone].    We comply with applicable federal civil rights laws and [place] laws. We do not discriminate on the basis of race, color, national origin, age, disability, sex, sexual orientation, or gender identity.            Thank you!     Thank you for choosing [facility]  for your care. Our goal is always to provide you with excellent care. Hearing back from our patients is one way we can continue to improve our services. Please take a few minutes to complete the written survey that you may receive in the mail after your visit with us. Thank you!             Your Updated Medication List - Protect others around you: Learn how to safely use, store and throw away your medicines at www.disposemymeds.org.          This list is accurate as of 8/13/18  2:44 PM.  Always use your most recent med list.                   Brand Name Dispense Instructions for use Diagnosis    * allopurinol 100 MG tablet    ZYLOPRIM    90 tablet    Take 1 tab daily with 300mg for total of 400mg    Idiopathic gout involving toe, unspecified chronicity,  unspecified laterality       * allopurinol 300 MG tablet    ZYLOPRIM    90 tablet    Take 1 tablet (300 mg) by mouth daily    Idiopathic gout involving toe, unspecified chronicity, unspecified laterality       COLCRYS 0.6 MG tablet   Generic drug:  colchicine     30 tablet    TAKE TWO TABLETS BY MOUTH AT THE FIRST SIGN OF FLARE, TAKE ONE ADDITIONAL TABLET ONE HOUR LATER    Idiopathic gout involving toe, unspecified chronicity, unspecified laterality       indomethacin 25 MG capsule    INDOCIN    42 capsule    Take 1 capsule (25 mg) by mouth 2 times daily (with meals)    Acute gouty arthropathy       * Notice:  This list has 2 medication(s) that are the same as other medications prescribed for you. Read the directions carefully, and ask your doctor or other care provider to review them with you.

## 2018-08-18 ENCOUNTER — OFFICE VISIT (OUTPATIENT)
Dept: URGENT CARE | Facility: URGENT CARE | Age: 58
End: 2018-08-18
Payer: COMMERCIAL

## 2018-08-18 VITALS
WEIGHT: 230.8 LBS | HEART RATE: 77 BPM | BODY MASS INDEX: 34.08 KG/M2 | TEMPERATURE: 98.3 F | DIASTOLIC BLOOD PRESSURE: 78 MMHG | SYSTOLIC BLOOD PRESSURE: 120 MMHG | OXYGEN SATURATION: 96 %

## 2018-08-18 DIAGNOSIS — M10.9 ACUTE GOUTY ARTHROPATHY: ICD-10-CM

## 2018-08-18 DIAGNOSIS — M10.079 IDIOPATHIC GOUT INVOLVING TOE, UNSPECIFIED CHRONICITY, UNSPECIFIED LATERALITY: ICD-10-CM

## 2018-08-18 PROCEDURE — 99214 OFFICE O/P EST MOD 30 MIN: CPT | Performed by: FAMILY MEDICINE

## 2018-08-18 RX ORDER — COLCHICINE 0.6 MG/1
TABLET ORAL
Qty: 30 TABLET | Refills: 0 | Status: SHIPPED | OUTPATIENT
Start: 2018-08-18 | End: 2020-01-06

## 2018-08-18 RX ORDER — INDOMETHACIN 25 MG/1
25 CAPSULE ORAL
Qty: 30 CAPSULE | Refills: 0 | Status: SHIPPED | OUTPATIENT
Start: 2018-08-18 | End: 2020-01-06

## 2018-08-18 NOTE — PROGRESS NOTES
SUBJECTIVE:  Chief Complaint   Patient presents with     Urgent Care     Arthritis     start today- Am sx right hand, pain, swelling, pain across the wrist bones  hx of gout, experienced in feet before never in hand tx colcrys and ice, trying to use right hand      Pavel Greenwood is a 58 year old male who presents with a chief complaint of right hand pain and swelling.    Patient with gout, usually gets gout flare in feet/toe but never in hands.  Patient denies any excessive use of hand or trauma, woke up and had pain and swelling.  Patient states that last gout flare was about 4 months ago.  Patient has been taking allopurinol and this had been helping.    Patient states that he took 3 colchicine this morning for flare, in the past has been taking 2 colchicine after the initial fare daily for about 10 days.  Patient unsure about indomethacin, thought that this medication does the same thing as colchicine.    Patient works as a     Past Medical History:   Diagnosis Date     Colon polyps      Gout      Otitis media     recurrent, s/p multiple PE tubes as a child     Current Outpatient Prescriptions   Medication Sig Dispense Refill     allopurinol (ZYLOPRIM) 100 MG tablet Take 1 tab daily with 300mg for total of 400mg 90 tablet 1     allopurinol (ZYLOPRIM) 300 MG tablet Take 1 tablet (300 mg) by mouth daily 90 tablet 1     COLCRYS 0.6 MG tablet TAKE TWO TABLETS BY MOUTH AT THE FIRST SIGN OF FLARE, TAKE ONE ADDITIONAL TABLET ONE HOUR LATER 30 tablet 0     indomethacin (INDOCIN) 25 MG capsule Take 1 capsule (25 mg) by mouth 2 times daily (with meals) (Patient not taking: Reported on 8/13/2018) 42 capsule 1     Social History   Substance Use Topics     Smoking status: Former Smoker     Quit date: 5/11/2008     Smokeless tobacco: Never Used     Alcohol use 4.8 oz/week     8 Standard drinks or equivalent per week      Comment: 6-7 drinks per week        ROS:  Review of systems negative except as stated  above.    EXAM:   /78 (BP Location: Right arm, Patient Position: Chair, Cuff Size: Adult Large)  Pulse 77  Temp 98.3  F (36.8  C) (Oral)  Wt 230 lb 12.8 oz (104.7 kg)  SpO2 96%  BMI 34.08 kg/m2  M/S Exam:right hand - mild discomfort thumb and 1st-2nd metacarpals, mild swelling.  No erythema. decreased ROM   GENERAL APPEARANCE: healthy, alert and mild distress  EXTREMITIES: peripheral pulses normal  PSYCH: alert, affect bright    X-RAY was not done.    ASSESSMENT/PLAN:  (M10.9) Acute gouty arthropathy  Comment: right hand  Plan: indomethacin (INDOCIN) 25 MG capsule, order for        DME            (M10.079) Idiopathic gout involving toe, unspecified chronicity, unspecified laterality  Plan: colchicine (COLCRYS) 0.6 MG tablet            Clarification given to patient - take colchicine 2 tablet at onset of gout flare, repeat with 1 tablet in 1-2 hours.  This can be taken daily if part of the prophylactic treatment but according to how patient reports taking, he may have misunderstood.  RX colchicine given.  Reviewed indomethacin and treatment use, RX indomethacin given.  Continue with allopurinol for now.  DME for thumb spica splint to help with support and comfort.    Follow up with primary provider if no resolution of symptoms.    Prateek Pisano MD  August 18, 2018 6:05 PM

## 2018-08-18 NOTE — MR AVS SNAPSHOT
After Visit Summary   8/18/2018    Pavel Greenwood    MRN: [id number]           Patient Information     Date Of Birth          1960        Visit Information        Provider Department      8/18/2018 4:25 PM Prateek Pisano MD [facility]        Today's Diagnoses     Idiopathic gout involving toe, unspecified chronicity, unspecified laterality        Acute gouty arthropathy          Care Instructions    Take indomethacin 25 mg - 1 to 2 tablets three times a day as needed.      Treating Gout Attacks     Raising the joint above the level of your heart can help reduce gout symptoms.     Gout is a disease that affects the joints. It is caused by excess uric acid in your blood stream that may lead to crystals forming in your joints. Left untreated, it can lead to painful foot and joint deformities and even kidney problems. But, by treating gout early, you can relieve pain and help prevent future problems. Gout can usually be treated with medication and proper diet. In severe cases, surgery may be needed.  Gout attacks are painful and often happen more than once. Taking medications may reduce pain and prevent attacks in the future. There are also some things you can do at home to relieve symptoms.  Medications for gout  Your healthcare provider may prescribe a daily medication to reduce levels of uric acid. Reducing your uric acid levels may help prevent gout attacks. Allopurinol is one commonly used medication taken daily to reduce uric acid levels. Other medications can help relieve pain and swelling during an acute attack. Medicines such as NSAIDs (nonsteroidal anti-inflammatory medicines), steroids, and colchicine may be prescribed for intermittent use to relieve an acute gout attack. Be sure to take your medication as directed.  What you can do  Below are some things you can do at home to relieve gout symptoms. Your healthcare provider may have other tips.    Rest the painful joint as much  as you can.    Raise the painful joint so it is at a level higher than your heart.    Use ice for 10 minutes every 1-2 hours as possible.  How can I prevent gout?  With a little effort, you may be able to prevent gout attacks in the future. Here are some things you can do:    Avoid foods high in purines  ? Certain meats (red meat, processed meat, turkey)  ? Organ meats (kidney, liver, sweetbread)  ? Shellfish (lobster, crab, shrimp, scallop, mussel)  ? Certain fish (anchovy, sardine, herring, mackerel)    Take any medications prescribed by your healthcare provider.    Lose weight if you need to.    Reduce high fructose corn syrup in meals and drinks.    Reduce or eliminate consumption of alcohol, particularly beer, but also red wine and spirits.    Control blood pressure, diabetes, and cholesterol.    Drink plenty of water to help flush uric acid from your body.  Date Last Reviewed: 2/1/2016 2000-2017 [employer]. [address] [place] [address]. All rights reserved. This information is not intended as a substitute for professional medical care. Always follow your healthcare professional's instructions.                Follow-ups after your visit        Your next 10 appointments already scheduled     Aug 20, 2018 12:20 PM CDT   (Arrive by 12:05 PM)   EVELIA Extremity with Nabeel Shine PT   [facility] [place] ([facility] [place]  )    [address] [place] [address]  [address]  [facility] [address]   [phone]            Aug 20, 2018  1:40 PM CDT   Lab visit with  LAB   [facility] [place] [facility]    [address] [place] [address]  [address]  [facility] [id number] [phone]           Please do not eat 10-12 hours before your appointment if you are coming in fasting for labs on lipids, cholesterol, or glucose (sugar). Does not apply to pregnant women.  Water with medications is okay. Do not drink coffee or other fluids.  If you have concerns about taking  your medications, please send a message by clicking on Secure Messaging, Message Your Care Team.            Aug 27, 2018  1:30 PM CDT   (Arrive by 1:15 PM)   EVELIA Extremity with Nabeel Shine PT   [facility] [place] ([place]  )    [address]  [place] [address]   [phone]              Who to contact     If you have questions or need follow up information about today's clinic visit or your schedule please contact [facility] [facility] directly at [phone].  Normal or non-critical lab and imaging results will be communicated to you by Mail'Insidehart, letter or phone within 4 business days after the clinic has received the results. If you do not hear from us within 7 days, please contact the clinic through Mail'Insidehart or phone. If you have a critical or abnormal lab result, we will notify you by phone as soon as possible.  Submit refill requests through [employer] or call your pharmacy and they will forward the refill request to us. Please allow 3 business days for your refill to be completed.          Additional Information About Your Visit        MyChart Information     [employer] gives you secure access to your electronic health record. If you see a primary care provider, you can also send messages to your care team and make appointments. If you have questions, please call your primary care clinic.  If you do not have a primary care provider, please call [phone] and they will assist you.        Care EveryWhere ID     This is your Care EveryWhere ID. This could be used by other organizations to access your [facility] [place] medical records  [id number]        Your Vitals Were     Pulse Temperature Pulse Oximetry BMI (Body Mass Index)          77 98.3  F (36.8  C) (Oral) 96% 34.08 kg/m2         Blood Pressure from Last 3 Encounters:   08/18/18 120/78   08/13/18 120/76   04/26/18 132/76    Weight from Last 3 Encounters:   08/18/18 230 lb 12.8 oz (104.7 kg)   08/13/18 231  lb 9 oz (105 kg)   04/26/18 230 lb 8 oz (104.6 kg)              Today, you had the following     No orders found for display         Today's Medication Changes          These changes are accurate as of 8/18/18  4:53 PM.  If you have any questions, ask your nurse or doctor.               Start taking these medicines.        Dose/Directions    order for DME   Used for:  Acute gouty arthropathy   Started by:  Prateek Pisano MD        Equipment being ordered: right thumb spica splint   Quantity:  1 Device   Refills:  0         These medicines have changed or have updated prescriptions.        Dose/Directions    indomethacin 25 MG capsule   Commonly known as:  INDOCIN   This may have changed:  when to take this   Used for:  Acute gouty arthropathy   Changed by:  Prateek Pisano MD        Dose:  25 mg   Take 1 capsule (25 mg) by mouth 3 times daily (with meals)   Quantity:  30 capsule   Refills:  0            Where to get your medicines      These medications were sent to [facility] [id number]  [place] - [address] [place] [address]  AT [facility] & [address] [place] [address]     Phone:  [phone]     colchicine 0.6 MG tablet    indomethacin 25 MG capsule         Some of these will need a paper prescription and others can be bought over the counter.  Ask your nurse if you have questions.     Bring a paper prescription for each of these medications     order for DME                Primary Care Provider Office Phone # Fax #    Janet Maoyrga [phone] [address] [place] [address]        Equal Access to Services     [facility]: Hadii richard hoo Socorky, waaxda luqadaha, qaybta kaalmada adeegyada, atif crane. So [facility] [phone].    ATENCIÓN: Si habla español, tiene a cooper disposición servicios gratuitos de asistencia lingüística. Llame al [phone].    We comply with applicable federal civil rights laws and  [place] laws. We do not discriminate on the basis of race, color, national origin, age, disability, sex, sexual orientation, or gender identity.            Thank you!     Thank you for choosing [facility]  for your care. Our goal is always to provide you with excellent care. Hearing back from our patients is one way we can continue to improve our services. Please take a few minutes to complete the written survey that you may receive in the mail after your visit with us. Thank you!             Your Updated Medication List - Protect others around you: Learn how to safely use, store and throw away your medicines at www.disposemymeds.org.          This list is accurate as of 8/18/18  4:53 PM.  Always use your most recent med list.                   Brand Name Dispense Instructions for use Diagnosis    * allopurinol 100 MG tablet    ZYLOPRIM    90 tablet    Take 1 tab daily with 300mg for total of 400mg    Idiopathic gout involving toe, unspecified chronicity, unspecified laterality       * allopurinol 300 MG tablet    ZYLOPRIM    90 tablet    Take 1 tablet (300 mg) by mouth daily    Idiopathic gout involving toe, unspecified chronicity, unspecified laterality       colchicine 0.6 MG tablet    COLCRYS    30 tablet    TAKE TWO TABLETS BY MOUTH AT THE FIRST SIGN OF FLARE, TAKE ONE ADDITIONAL TABLET ONE HOUR LATER    Idiopathic gout involving toe, unspecified chronicity, unspecified laterality       indomethacin 25 MG capsule    INDOCIN    30 capsule    Take 1 capsule (25 mg) by mouth 3 times daily (with meals)    Acute gouty arthropathy       order for DME     1 Device    Equipment being ordered: right thumb spica splint    Acute gouty arthropathy       * Notice:  This list has 2 medication(s) that are the same as other medications prescribed for you. Read the directions carefully, and ask your doctor or other care provider to review them with you.

## 2018-08-18 NOTE — PATIENT INSTRUCTIONS
Take indomethacin 25 mg - 1 to 2 tablets three times a day as needed.      Treating Gout Attacks     Raising the joint above the level of your heart can help reduce gout symptoms.     Gout is a disease that affects the joints. It is caused by excess uric acid in your blood stream that may lead to crystals forming in your joints. Left untreated, it can lead to painful foot and joint deformities and even kidney problems. But, by treating gout early, you can relieve pain and help prevent future problems. Gout can usually be treated with medication and proper diet. In severe cases, surgery may be needed.  Gout attacks are painful and often happen more than once. Taking medications may reduce pain and prevent attacks in the future. There are also some things you can do at home to relieve symptoms.  Medications for gout  Your healthcare provider may prescribe a daily medication to reduce levels of uric acid. Reducing your uric acid levels may help prevent gout attacks. Allopurinol is one commonly used medication taken daily to reduce uric acid levels. Other medications can help relieve pain and swelling during an acute attack. Medicines such as NSAIDs (nonsteroidal anti-inflammatory medicines), steroids, and colchicine may be prescribed for intermittent use to relieve an acute gout attack. Be sure to take your medication as directed.  What you can do  Below are some things you can do at home to relieve gout symptoms. Your healthcare provider may have other tips.    Rest the painful joint as much as you can.    Raise the painful joint so it is at a level higher than your heart.    Use ice for 10 minutes every 1-2 hours as possible.  How can I prevent gout?  With a little effort, you may be able to prevent gout attacks in the future. Here are some things you can do:    Avoid foods high in purines  ? Certain meats (red meat, processed meat, turkey)  ? Organ meats (kidney, liver, sweetbread)  ? Shellfish (lobster, crab,  shrimp, scallop, mussel)  ? Certain fish (anchovy, sardine, herring, mackerel)    Take any medications prescribed by your healthcare provider.    Lose weight if you need to.    Reduce high fructose corn syrup in meals and drinks.    Reduce or eliminate consumption of alcohol, particularly beer, but also red wine and spirits.    Control blood pressure, diabetes, and cholesterol.    Drink plenty of water to help flush uric acid from your body.  Date Last Reviewed: 2/1/2016 2000-2017 [employer]. [address] [place], [place] [address]. All rights reserved. This information is not intended as a substitute for professional medical care. Always follow your healthcare professional's instructions.

## 2018-08-20 ENCOUNTER — THERAPY VISIT (OUTPATIENT)
Dept: PHYSICAL THERAPY | Facility: CLINIC | Age: 58
End: 2018-08-20
Payer: COMMERCIAL

## 2018-08-20 DIAGNOSIS — M10.079 IDIOPATHIC GOUT INVOLVING TOE, UNSPECIFIED CHRONICITY, UNSPECIFIED LATERALITY: ICD-10-CM

## 2018-08-20 DIAGNOSIS — M25.511 RIGHT SHOULDER PAIN: Primary | ICD-10-CM

## 2018-08-20 LAB
ERYTHROCYTE [DISTWIDTH] IN BLOOD BY AUTOMATED COUNT: 13.5 % (ref 10–15)
HCT VFR BLD AUTO: 41.2 % (ref 40–53)
HGB BLD-MCNC: 14.4 G/DL (ref 13.3–17.7)
MCH RBC QN AUTO: 31.4 PG (ref 26.5–33)
MCHC RBC AUTO-ENTMCNC: 35 G/DL (ref 31.5–36.5)
MCV RBC AUTO: 90 FL (ref 78–100)
PLATELET # BLD AUTO: 249 10E9/L (ref 150–450)
RBC # BLD AUTO: 4.58 10E12/L (ref 4.4–5.9)
WBC # BLD AUTO: 4.9 10E9/L (ref 4–11)

## 2018-08-20 PROCEDURE — 97110 THERAPEUTIC EXERCISES: CPT | Mod: GP | Performed by: PHYSICAL THERAPIST

## 2018-08-20 PROCEDURE — 36415 COLL VENOUS BLD VENIPUNCTURE: CPT | Performed by: INTERNAL MEDICINE

## 2018-08-20 PROCEDURE — 97161 PT EVAL LOW COMPLEX 20 MIN: CPT | Mod: GP | Performed by: PHYSICAL THERAPIST

## 2018-08-20 PROCEDURE — 84550 ASSAY OF BLOOD/URIC ACID: CPT | Performed by: INTERNAL MEDICINE

## 2018-08-20 PROCEDURE — 85027 COMPLETE CBC AUTOMATED: CPT | Performed by: INTERNAL MEDICINE

## 2018-08-20 NOTE — PROGRESS NOTES
[facility] Initial Evaluation  Subjective:  Patient is a 58 year old male presenting with rehab right shoulder hpi. The history is provided by the patient. No  was used.   Pavel Greenwood is a 58 year old male with a right shoulder condition.  Condition occurred with:  Unknown cause.  Condition occurred: for unknown reasons.  This is a new condition  Patient reports a gradual onset of shoulder pain beginning about 4 months ago.  Patient reports reaching to extremes and sleeping on the right shoulder cause pain.  Physical therapy was order 8/13/2018.    Patient reports pain:  In the joint.    Quality: dull. and is intermittent Pain Scale: 0/10  -  0-4/10.  Associated symptoms:  Loss of motion/stiffness. Pain is worse in the A.M..  Symptoms are exacerbated by lifting and using arm overhead and relieved by rest.  Since onset symptoms are unchanged.  Special tests:  X-ray.  Previous treatment: none.    General health as reported by patient is good.  Pertinent medical history includes:  Overweight and smoking.  Medical allergies: no.  Other surgeries include:  None reported.  Current medications:  Other and pain medication.  Current occupation .  Patient is working in normal job without restrictions.      Barriers include:  None as reported by the patient.    Red flags:  None as reported by the patient.                        Objective:  Standing Alignment:      Shoulder/UE:  Rounded shoulders, protracted scapula L and protracted scapula R                                       Shoulder Evaluation:  ROM:  AROM:    Flexion:  Right:  ERP    Abduction:  Right:  ERP      External Rotation:  Right:  ERP            Extension/Internal Rotation:  Right:  Min Loss    PROM:    Flexion:  Right: ERP      Abduction:  Right:  ERP    Internal Rotation:  Right:  ERP  External Rotation:  Right:  WNL                    Strength:        Abduction:  Right: 5/5      Pain:-  Adduction:  Right:  5/5      Pain:-  Internal Rotation:  Right: 5/5      Pain:-  External Rotation:   Right:5-/5      Pain:-      Horizontal Adduction:  Right:/5     Pain:-      Stability Testing:  normal      Special Tests:      Right shoulder positive for the following special tests:Impingement  Right shoulder negative for the following special tests:Rotator cuff tear    Mobility Tests:  normal                                                 General     ROS    Assessment/Plan:    Patient is a 58 year old male with right side shoulder complaints.    Patient has the following significant findings with corresponding treatment plan.                Diagnosis 1:  Shoulder Impingement  Pain -  self management, education and home program  Decreased ROM/flexibility - therapeutic exercise, therapeutic activity and home program  Decreased strength - therapeutic exercise, therapeutic activities and home program  Impaired muscle performance - neuro re-education and home program  Decreased function - therapeutic activities and home program  Impaired posture - neuro re-education, therapeutic activities and home program    Therapy Evaluation Codes:   1) History comprised of:   Personal factors that impact the plan of care:      None.    Comorbidity factors that impact the plan of care are:      Overweight and Smoking.     Medications impacting care: Anti-depressant, Pain and other.  2) Examination of Body Systems comprised of:   Body structures and functions that impact the plan of care:      Shoulder.   Activity limitations that impact the plan of care are:      Lifting, Sleeping and Reaching.  3) Clinical presentation characteristics are:   Stable/Uncomplicated.  4) Decision-Making    Low complexity using standardized patient assessment instrument and/or measureable assessment of functional outcome.  Cumulative Therapy Evaluation is: Low complexity.    Previous and current functional limitations:  (See Goal Flow Sheet for this information)     Short term and Long term goals: (See Goal Flow Sheet for this information)     Communication ability:  Patient appears to be able to clearly communicate and understand verbal and written communication and follow directions correctly.  Treatment Explanation - The following has been discussed with the patient:   RX ordered/plan of care  Anticipated outcomes  Possible risks and side effects  This patient would benefit from PT intervention to resume normal activities.   Rehab potential is good.    Frequency:  1 X week, once daily  Duration:  for 6 weeks  Discharge Plan:  Achieve all LTG.  Independent in home treatment program.  Reach maximal therapeutic benefit.    Please refer to the daily flowsheet for treatment today, total treatment time and time spent performing 1:1 timed codes.

## 2018-08-21 LAB — URATE SERPL-MCNC: 4.5 MG/DL (ref 3.5–7.2)

## 2018-08-23 ENCOUNTER — MYC MEDICAL ADVICE (OUTPATIENT)
Dept: PEDIATRICS | Facility: CLINIC | Age: 58
End: 2018-08-23

## 2018-08-24 NOTE — TELEPHONE ENCOUNTER
Patient states he is taking 500 mg of allopurinol and not 400 mg. Dial back or stay at current dose taking?    Sherine Nguyễn RN

## 2018-08-27 ENCOUNTER — THERAPY VISIT (OUTPATIENT)
Dept: PHYSICAL THERAPY | Facility: CLINIC | Age: 58
End: 2018-08-27
Payer: COMMERCIAL

## 2018-08-27 DIAGNOSIS — M25.511 RIGHT SHOULDER PAIN: ICD-10-CM

## 2018-08-27 PROCEDURE — 97110 THERAPEUTIC EXERCISES: CPT | Mod: GP | Performed by: PHYSICAL THERAPIST

## 2018-08-27 PROCEDURE — 97112 NEUROMUSCULAR REEDUCATION: CPT | Mod: GP | Performed by: PHYSICAL THERAPIST

## 2018-08-29 ENCOUNTER — MYC MEDICAL ADVICE (OUTPATIENT)
Dept: PEDIATRICS | Facility: CLINIC | Age: 58
End: 2018-08-29

## 2018-08-29 DIAGNOSIS — M10.079 IDIOPATHIC GOUT INVOLVING TOE, UNSPECIFIED CHRONICITY, UNSPECIFIED LATERALITY: ICD-10-CM

## 2018-08-30 RX ORDER — ALLOPURINOL 100 MG/1
TABLET ORAL
Qty: 180 TABLET | Refills: 1 | Status: SHIPPED | OUTPATIENT
Start: 2018-08-30 | End: 2018-10-01

## 2018-08-30 NOTE — TELEPHONE ENCOUNTER
See pt's MC message. Sent a new rx to accommodate allopurinol 500 mg every day as per MD's advise below. Notified pt.     MC message from  on 8/24:  If your taking 500mg of allopurinol, I'd stay on that dose for now. We may be able to cut back at some point (I know it's a lot of pills!), but I'd like to keep things stable, and gout free, for at least a little bit. If the pill amount becomes burdensome, let me know as we can cut back sooner.     Sultana, RN  Triage Nurse

## 2018-09-17 ENCOUNTER — MYC REFILL (OUTPATIENT)
Dept: PEDIATRICS | Facility: CLINIC | Age: 58
End: 2018-09-17

## 2018-09-17 ENCOUNTER — THERAPY VISIT (OUTPATIENT)
Dept: PHYSICAL THERAPY | Facility: CLINIC | Age: 58
End: 2018-09-17
Payer: COMMERCIAL

## 2018-09-17 DIAGNOSIS — M10.079 IDIOPATHIC GOUT INVOLVING TOE, UNSPECIFIED CHRONICITY, UNSPECIFIED LATERALITY: ICD-10-CM

## 2018-09-17 DIAGNOSIS — M25.511 RIGHT SHOULDER PAIN: ICD-10-CM

## 2018-09-17 PROCEDURE — 97112 NEUROMUSCULAR REEDUCATION: CPT | Mod: GP | Performed by: PHYSICAL THERAPIST

## 2018-09-17 PROCEDURE — 97110 THERAPEUTIC EXERCISES: CPT | Mod: GP | Performed by: PHYSICAL THERAPIST

## 2018-09-17 RX ORDER — ALLOPURINOL 300 MG/1
300 TABLET ORAL DAILY
Qty: 90 TABLET | Refills: 1 | Status: CANCELLED | OUTPATIENT
Start: 2018-09-17

## 2018-09-18 NOTE — TELEPHONE ENCOUNTER
Message from [employer]:  Original authorizing provider: Janet Mueller MD    Ace FRENCHRolando Timo would like a refill of the following medications:  allopurinol (ZYLOPRIM) 300 MG tablet [Janet Mueller MD]    Preferred pharmacy: [facility] [place], [place] - [address] [place] [address]     Comment:

## 2018-09-19 NOTE — TELEPHONE ENCOUNTER
Patient requesting refill, but we refilled these back in August.   Sent [employer] message to update the patient.

## 2018-10-01 ENCOUNTER — MYC REFILL (OUTPATIENT)
Dept: PEDIATRICS | Facility: CLINIC | Age: 58
End: 2018-10-01

## 2018-10-01 DIAGNOSIS — M10.079 IDIOPATHIC GOUT INVOLVING TOE, UNSPECIFIED CHRONICITY, UNSPECIFIED LATERALITY: ICD-10-CM

## 2018-10-02 NOTE — TELEPHONE ENCOUNTER
Message from [employer]:  Original authorizing provider: Janet Mueller MD    Ace FRENCHRolando Timo would like a refill of the following medications:  allopurinol (ZYLOPRIM) 100 MG tablet [Janet Mueller MD]    Preferred pharmacy: [facility] [place], [place] - [address] [place] [address]     Comment:

## 2018-10-04 RX ORDER — ALLOPURINOL 100 MG/1
TABLET ORAL
Qty: 180 TABLET | Refills: 1 | Status: SHIPPED | OUTPATIENT
Start: 2018-10-04 | End: 2019-03-01

## 2018-10-04 NOTE — TELEPHONE ENCOUNTER
Prescription approved per [facility] Refill Protocol.    Mckenna Mccloud RN

## 2018-10-08 ENCOUNTER — MYC MEDICAL ADVICE (OUTPATIENT)
Dept: PEDIATRICS | Facility: CLINIC | Age: 58
End: 2018-10-08

## 2018-10-08 ENCOUNTER — THERAPY VISIT (OUTPATIENT)
Dept: PHYSICAL THERAPY | Facility: CLINIC | Age: 58
End: 2018-10-08
Payer: COMMERCIAL

## 2018-10-08 DIAGNOSIS — M25.511 RIGHT SHOULDER PAIN: ICD-10-CM

## 2018-10-08 PROCEDURE — 97110 THERAPEUTIC EXERCISES: CPT | Mod: GP | Performed by: PHYSICAL THERAPIST

## 2018-10-08 PROCEDURE — 97112 NEUROMUSCULAR REEDUCATION: CPT | Mod: GP | Performed by: PHYSICAL THERAPIST

## 2018-10-08 NOTE — TELEPHONE ENCOUNTER
Huddled with :  - if pt is unable to see ENT in the next couple of weeks, he need to be seen in clinic for alternate abx    If pt sends a MC message or calls us that she couldn't see ENT & requests for an abx, will notify pt as above.     Sultana, RN  Triage Nurse

## 2018-10-08 NOTE — TELEPHONE ENCOUNTER
See pt's MC message. Would you prefer to treat his recurrent ear inf with an alternate abx till he f/u with ENT? Please advise.     LOV was on 8/13/18. Sent MC message to pt with the following ENT contact info.     Your provider has referred you to: N: [facility] [phone]   [email or web address]    Sultana, RN  Triage Nurse

## 2018-10-09 ENCOUNTER — TRANSFERRED RECORDS (OUTPATIENT)
Dept: HEALTH INFORMATION MANAGEMENT | Facility: CLINIC | Age: 58
End: 2018-10-09

## 2018-10-11 ENCOUNTER — MYC REFILL (OUTPATIENT)
Dept: PEDIATRICS | Facility: CLINIC | Age: 58
End: 2018-10-11

## 2018-10-11 DIAGNOSIS — M10.079 IDIOPATHIC GOUT INVOLVING TOE, UNSPECIFIED CHRONICITY, UNSPECIFIED LATERALITY: ICD-10-CM

## 2018-10-11 NOTE — TELEPHONE ENCOUNTER
Message from [email or web address]:  Original authorizing provider: Janet Mueller MD    Ace FRENCHRolando Timo would like a refill of the following medications:  allopurinol (ZYLOPRIM) 300 MG tablet [Janet Mueller MD]    Preferred pharmacy: [facility] [place], [place] - [address] [place] [address]     Comment:

## 2018-10-11 NOTE — TELEPHONE ENCOUNTER
"Requested Prescriptions   Pending Prescriptions Disp Refills     allopurinol (ZYLOPRIM) 300 MG tablet  Last Written Prescription Date:  10/04/2018  Last Fill Quantity: 180,  # refills: 1   Last office visit: 8/13/2018 with prescribing provider:   SULEMAN   Future Office Visit:     90 tablet 1     Sig: Take 1 tablet (300 mg) by mouth daily    Gout Agents Protocol Passed    10/11/2018  4:40 PM       Passed - CBC on file in past 12 months    Recent Labs   Lab Test  08/20/18   1335   WBC  4.9   RBC  4.58   HGB  14.4   HCT  41.2   PLT  249       For [facility] ONLY: OADY403 = WBC, SYMH037 = RBC         Passed - ALT on file in past 12 months    Recent Labs   Lab Test  07/16/18   1354   ALT  38            Passed - Has Uric Acid on file in past 12 months and value is less than 6    Recent Labs   Lab Test  08/20/18   1335   URIC  4.5     If level is 6mg/dL or greater, ok to refill one time and refer to provider.          Passed - Recent (12 mo) or future (30 days) visit within the authorizing provider's specialty    Patient had office visit in the last 12 months or has a visit in the next 30 days with authorizing provider or within the authorizing provider's specialty.  See \"Patient Info\" tab in inbasket, or \"Choose Columns\" in Meds & Orders section of the refill encounter.           Passed - Patient is age 18 or older       Passed - Normal serum creatinine on file in the past 12 months    Recent Labs   Lab Test  07/16/18   1354   CR  0.76               "

## 2018-10-12 RX ORDER — ALLOPURINOL 300 MG/1
300 TABLET ORAL DAILY
Qty: 90 TABLET | Refills: 1 | OUTPATIENT
Start: 2018-10-12

## 2018-10-22 ENCOUNTER — THERAPY VISIT (OUTPATIENT)
Dept: PHYSICAL THERAPY | Facility: CLINIC | Age: 58
End: 2018-10-22
Payer: COMMERCIAL

## 2018-10-22 DIAGNOSIS — M25.511 RIGHT SHOULDER PAIN: ICD-10-CM

## 2018-10-22 PROCEDURE — 97112 NEUROMUSCULAR REEDUCATION: CPT | Mod: GP | Performed by: PHYSICAL THERAPIST

## 2018-10-22 PROCEDURE — 97110 THERAPEUTIC EXERCISES: CPT | Mod: GP | Performed by: PHYSICAL THERAPIST

## 2018-11-05 ENCOUNTER — THERAPY VISIT (OUTPATIENT)
Dept: PHYSICAL THERAPY | Facility: CLINIC | Age: 58
End: 2018-11-05
Payer: COMMERCIAL

## 2018-11-05 DIAGNOSIS — M25.511 RIGHT SHOULDER PAIN: ICD-10-CM

## 2018-11-05 PROCEDURE — 97112 NEUROMUSCULAR REEDUCATION: CPT | Mod: GP | Performed by: PHYSICAL THERAPIST

## 2018-11-05 PROCEDURE — 97110 THERAPEUTIC EXERCISES: CPT | Mod: GP | Performed by: PHYSICAL THERAPIST

## 2018-11-05 NOTE — PROGRESS NOTES
Subjective:  HPI                    Objective:  System    Physical Exam    General     ROS    Assessment/Plan:    PROGRESS  REPORT    Progress reporting period is from 8/20/2018 to 11/5/2018.       SUBJECTIVE  Subjective changes noted by patient:  R shoulder is feeling much better, no pain. Has returned completely to normal activity, no longer modifying. Has even been able to sleep on the R side.    Current pain level is 0/10.     Previous pain level was  4/10  .   Changes in function:  Yes (See Goal flowsheet attached for changes in current functional level)  Adverse reaction to treatment or activity: None    OBJECTIVE  Changes noted in objective findings:  Yes,   Objective: AROM R Shldr: normal all directions, no pain; Strength R shldr Flx: 5/5, Abd: 5/5, ER: 5/5, IR: 5/5, Ext: 5/5     ASSESSMENT/PLAN  Updated problem list and treatment plan: Diagnosis 1:  R shoulder pain  Impaired muscle performance - home program  STG/LTGs have been met or progress has been made towards goals:  Yes (See Goal flow sheet completed today.)  Assessment of Progress: The patient has met all of their long term goals.  Self Management Plans:  Patient is independent in a home treatment program.  I have re-evaluated this patient and find that the nature, scope, duration and intensity of the therapy is appropriate for the medical condition of the patient.  Pavel continues to require the following intervention to meet STG and LTG's:  PT    Recommendations:  This patient is ready to be discharged from therapy and continue their home treatment program.  Continue working on HEP for 1 month, if pain returns before 1 month, scheduled PT appt.    Please refer to the daily flowsheet for treatment today, total treatment time and time spent performing 1:1 timed codes.

## 2018-11-13 ENCOUNTER — MYC REFILL (OUTPATIENT)
Dept: PEDIATRICS | Facility: CLINIC | Age: 58
End: 2018-11-13

## 2018-11-13 ENCOUNTER — TRANSFERRED RECORDS (OUTPATIENT)
Dept: HEALTH INFORMATION MANAGEMENT | Facility: CLINIC | Age: 58
End: 2018-11-13

## 2018-11-13 DIAGNOSIS — M10.079 IDIOPATHIC GOUT INVOLVING TOE, UNSPECIFIED CHRONICITY, UNSPECIFIED LATERALITY: ICD-10-CM

## 2018-11-13 RX ORDER — ALLOPURINOL 100 MG/1
TABLET ORAL
Qty: 180 TABLET | Refills: 1 | Status: CANCELLED | OUTPATIENT
Start: 2018-11-13

## 2018-11-13 RX ORDER — ALLOPURINOL 300 MG/1
300 TABLET ORAL DAILY
Qty: 90 TABLET | Refills: 1 | Status: CANCELLED | OUTPATIENT
Start: 2018-11-13

## 2018-11-13 NOTE — TELEPHONE ENCOUNTER
Message from MyChart:  Original authorizing provider: Janet Mueller MD    Ace FRENCHRolando Nancyenrrique would like a refill of the following medications:  allopurinol (ZYLOPRIM) 300 MG tablet [Janet Mueller MD]  allopurinol (ZYLOPRIM) 100 MG tablet [Janet Mueller MD]    Preferred pharmacy: [facility] [place], [place] - [address] [place] [address]     Comment:

## 2018-11-14 NOTE — TELEPHONE ENCOUNTER
Denied.   Allopurinol 100 mg 10/4/18 # 180, 1 refill  Allopurinol 300 mg 8/13/18 # 90, 1 refill.     Kori LOU RN

## 2018-12-17 NOTE — PROGRESS NOTES
[facility]  [address]  [address]  [place] [address]  [phone]  Dept: [phone]    PRE-OP EVALUATION:  Today's date: 2018    Pavel Greenwood (: 1960) presents for pre-operative evaluation assessment as requested by Dr. Ferreira.  He requires evaluation and anesthesia risk assessment prior to undergoing surgery/procedure for treatment of right ear  .    Fax number for surgical facility: [facility]   Primary Physician: Janet Mayorga  Type of Anesthesia Anticipated: to be determined    Patient has a Health Care Directive or Living Will:  NO    Preop Questions 2018   Who is doing your surgery? Dr. Ferreira   What are you having done? Right Ear cartilage graft tympanoplasty   Date of Surgery/Procedure: 18   Facility or Hospital where procedure/surgery will be performed: [facility]   1.  Do you have a history of Heart attack, stroke, stent, coronary bypass surgery, or other heart surgery? No   2.  Do you ever have any pain or discomfort in your chest? No   3.  Do you have a history of  Heart Failure? No   4.   Are you troubled by shortness of breath when:  walking on a level surface, or up a slight hill, or at night? No   5.  Do you currently have a cold, bronchitis or other respiratory infection? No   6.  Do you have a cough, shortness of breath, or wheezing? No   7.  Do you sometimes get pains in the calves of your legs when you walk? No   8. Do you or anyone in your family have previous history of blood clots? No   9.  Do you or does anyone in your family have a serious bleeding problem such as prolonged bleeding following surgeries or cuts? No   10. Have you ever had problems with anemia or been told to take iron pills? No   11. Have you had any abnormal blood loss such as black, tarry or bloody stools? No   12. Have you ever had a blood transfusion? No   13. Have you or any of your relatives ever had problems with anesthesia? No    14. Do you have sleep apnea, excessive snoring or daytime drowsiness? No   15. Do you have any prosthetic heart valves? No   16. Do you have prosthetic joints? No         HPI:     HPI related to upcoming procedure:     Patient with longstanding R tympanic membrane rupture, planning to undergo repair.     See problem list for active medical problems.  Problems all longstanding and stable, except as noted/documented.  See ROS for pertinent symptoms related to these conditions.                                                                                                                                                          .  Gout: Well controlled on 500mg of allopurinol. MOst recent flare was about 6 weeks ago and very mild.     MEDICAL HISTORY:     Patient Active Problem List    Diagnosis Date Noted     Right shoulder pain 08/20/2018     Priority: Medium     Idiopathic gout involving toe, unspecified chronicity, unspecified laterality 08/23/2016     Priority: Medium     Adenomatous polyp of colon tubovillous- repeat cscope 2016 03/26/2013     Priority: Medium     CARDIOVASCULAR SCREENING; LDL GOAL LESS THAN 160 02/11/2013     Priority: Medium      Past Medical History:   Diagnosis Date     Colon polyps      Gout      Otitis media     recurrent, s/p multiple PE tubes as a child     Past Surgical History:   Procedure Laterality Date     COLONOSCOPY       MYRINGOTOMY BILATERAL       TONSILLECTOMY       Current Outpatient Medications   Medication Sig Dispense Refill     allopurinol (ZYLOPRIM) 100 MG tablet Take 2 tabs daily with 300mg for total of 500mg 180 tablet 1     allopurinol (ZYLOPRIM) 300 MG tablet Take 1 tablet (300 mg) by mouth daily 90 tablet 1     colchicine (COLCRYS) 0.6 MG tablet TAKE TWO TABLETS BY MOUTH AT THE FIRST SIGN OF FLARE, TAKE ONE ADDITIONAL TABLET ONE HOUR LATER 30 tablet 0     indomethacin (INDOCIN) 25 MG capsule Take 1 capsule (25 mg) by mouth 3 times daily (with meals) 30 capsule 0      "order for DME Equipment being ordered: right thumb spica splint 1 Device 0     OTC products: None, except as noted above    No Known Allergies   Latex Allergy: NO    Social History     Tobacco Use     Smoking status: Former Smoker     Last attempt to quit: 5/11/2008     Years since quitting: 10.6     Smokeless tobacco: Never Used   Substance Use Topics     Alcohol use: Yes     Alcohol/week: 4.8 oz     Types: 8 Standard drinks or equivalent per week     Comment: 6-7 drinks per week      History   Drug Use No       REVIEW OF SYSTEMS:   Constitutional, neuro, ENT, endocrine, pulmonary, cardiac, gastrointestinal, genitourinary, musculoskeletal, integument and psychiatric systems are negative, except as otherwise noted.    EXAM:   /70 (BP Location: Right arm, Patient Position: Chair, Cuff Size: Adult Large)   Pulse 86   Temp 97.2  F (36.2  C) (Tympanic)   Ht 1.753 m (5' 9\")   Wt 107.6 kg (237 lb 5 oz)   SpO2 98%   BMI 35.04 kg/m      GENERAL APPEARANCE: healthy, alert and no distress     EYES: EOMI,  PERRL     HENT: ear canals and TM's normal and nose and mouth without ulcers or lesions     NECK: no adenopathy, no asymmetry, masses, or scars and thyroid normal to palpation     RESP: lungs clear to auscultation - no rales, rhonchi or wheezes     CV: regular rates and rhythm, normal S1 S2, no S3 or S4 and no murmur, click or rub     ABDOMEN:  soft, nontender, no HSM or masses and bowel sounds normal     MS: extremities normal- no gross deformities noted, no evidence of inflammation in joints, FROM in all extremities.     SKIN: no suspicious lesions or rashes     NEURO: Normal strength and tone, sensory exam grossly normal, mentation intact and speech normal     PSYCH: mentation appears normal. and affect normal/bright     LYMPHATICS: No cervical adenopathy    DIAGNOSTICS:   EKG: appears normal, NSR, T wave inversions in anterior and septal leads.     Recent Labs   Lab Test 08/20/18  1335 07/16/18  1354 " 06/11/18  1451   HGB 14.4 14.7 15.0    246 226   NA  --  140 141   POTASSIUM  --  4.1 4.2   CR  --  0.76 0.75        IMPRESSION:   Reason for surgery/procedure: chronically ruptured right tympanic membrane    The proposed surgical procedure is considered LOW risk.    REVISED CARDIAC RISK INDEX  The patient has the following serious cardiovascular risks for perioperative complications such as (MI, PE, VFib and 3  AV Block):  No serious cardiac risks  INTERPRETATION: 0 risks: Class I (very low risk - 0.4% complication rate)    The patient has the following additional risks for perioperative complications:  No identified additional risks      ICD-10-CM    1. Preop general physical exam Z01.818 EKG 12-lead complete w/read - Clinics     EKG 12-lead complete w/read - Clinics   2. Perforation of right tympanic membrane H72.91    3. Electrocardiogram showing T wave abnormalities R94.31 Exercise Stress Test - Adult       RECOMMENDATIONS:     Patient is to take all scheduled medications on the day of surgery.    Reviewed with patient that ECG shows Twave inversions. However, he is able to easily meet 4 METs of activity without any symptoms whatsoever, and has no issues with chest pain, shortness of breath, or early family history of heart disease. He is undergoing low risk surgery without risk for significant fluid shifts. Anticipate that most likely this is due to lead placement, however, repeat ECG was relatively stable. We discussed risks, benefits and both patient and I feel comfortable proceeding with surgery given reasons outlined above. However, given ECG findings (and no prior ECG to compare to) will plan to obtain baseline stress test following surgery to confirm that he has no significant coronary disease.     APPROVAL GIVEN to proceed with proposed procedure, without further diagnostic evaluation       Signed Electronically by: Janet Mayorga MD    Copy of this evaluation report is provided to requesting  physician.    [facility] Preop Guidelines    Revised Cardiac Risk Index

## 2018-12-17 NOTE — PATIENT INSTRUCTIONS
Likely your ECG (heart screen) was slightly abnormal due to sticker placement. Given that you have no family history of early heart disease and no heart related symptoms, it is very unlikely that you will have any cardiac issues that would affect your surgery, particularly given the nature of the surgery.     You should be able to go forward with surgery, but I would like for you to schedule a stress test of your heart in the next couple of weeks just to make sure there are no problems.     Take allopurinol at night before surgery with a small sip of water. Don't take indomethacin.      Before Your Surgery      Call your surgeon if there is any change in your health. This includes signs of a cold or flu (such as a sore throat, runny nose, cough, rash or fever).    Do not smoke, drink alcohol or take over the counter medicine (unless your surgeon or primary care doctor tells you to) for the 24 hours before and after surgery.    If you take prescribed drugs: Follow your doctor s orders about which medicines to take and which to stop until after surgery.    Eating and drinking prior to surgery: follow the instructions from your surgeon    Take a shower or bath the night before surgery. Use the soap your surgeon gave you to gently clean your skin. If you do not have soap from your surgeon, use your regular soap. Do not shave or scrub the surgery site.  Wear clean pajamas and have clean sheets on your bed.

## 2018-12-18 ENCOUNTER — OFFICE VISIT (OUTPATIENT)
Dept: PEDIATRICS | Facility: CLINIC | Age: 58
End: 2018-12-18
Payer: COMMERCIAL

## 2018-12-18 VITALS
HEIGHT: 69 IN | OXYGEN SATURATION: 98 % | HEART RATE: 86 BPM | WEIGHT: 237.31 LBS | TEMPERATURE: 97.2 F | SYSTOLIC BLOOD PRESSURE: 118 MMHG | BODY MASS INDEX: 35.15 KG/M2 | DIASTOLIC BLOOD PRESSURE: 70 MMHG

## 2018-12-18 DIAGNOSIS — H72.91 PERFORATION OF RIGHT TYMPANIC MEMBRANE: ICD-10-CM

## 2018-12-18 DIAGNOSIS — Z01.818 PREOP GENERAL PHYSICAL EXAM: Primary | ICD-10-CM

## 2018-12-18 DIAGNOSIS — R94.31 ELECTROCARDIOGRAM SHOWING T WAVE ABNORMALITIES: ICD-10-CM

## 2018-12-18 PROCEDURE — 99215 OFFICE O/P EST HI 40 MIN: CPT | Performed by: INTERNAL MEDICINE

## 2018-12-18 PROCEDURE — 93000 ELECTROCARDIOGRAM COMPLETE: CPT | Performed by: INTERNAL MEDICINE

## 2018-12-18 ASSESSMENT — MIFFLIN-ST. JEOR: SCORE: 1896.82

## 2019-01-02 ENCOUNTER — HOSPITAL ENCOUNTER (OUTPATIENT)
Dept: CARDIOLOGY | Facility: CLINIC | Age: 59
Discharge: HOME | End: 2019-01-02
Attending: INTERNAL MEDICINE | Admitting: INTERNAL MEDICINE
Payer: COMMERCIAL

## 2019-01-02 DIAGNOSIS — R94.31 ELECTROCARDIOGRAM SHOWING T WAVE ABNORMALITIES: ICD-10-CM

## 2019-01-02 PROCEDURE — 93017 CV STRESS TEST TRACING ONLY: CPT

## 2019-01-02 PROCEDURE — 93018 CV STRESS TEST I&R ONLY: CPT | Performed by: INTERNAL MEDICINE

## 2019-01-02 PROCEDURE — 93016 CV STRESS TEST SUPVJ ONLY: CPT | Performed by: INTERNAL MEDICINE

## 2019-01-02 NOTE — LETTER
[facility]  [address]  [place] [id number] [phone]   2019    Pavel FRENCH Nancyenrrique  [address]    [facility] [address]      Dear Ace,     Here are your stress test results. Everything looks very normal, which is excellent. Based on these results, I have no concerns about your heart and I anticipate that the things that we saw on your pre-operative ECG (the flipped Twave) is just a normal variation that you have. Nothing to be concerned about at this point! You shouldn't need any additional testing right now. Please let me know if you have any concerns or questions.     Sincerely,     Janet Hunter MD   Internal Medicine-Pediatrics         Results for orders placed or performed during the hospital encounter of 19   Exercise Stress Test - Adult    Narrative    [id number]  [id number]  [id number]  [id number]^DALE^JANET           [facility]  Echocardiography Laboratory  [address] [place] [address]        Name: PAVEL HAND  MRN: [id number]  : 1960  Study Date: 2019 10:42 AM  Age: 58 yrs  Gender: Male  Patient Location: [facility]  Reason For Study: Pre-Op, T-Wave Inversion  Ordering Physician: JANET HUNTER  Referring Physician: JANET HUNTER  Performed By: Antonino Molina     BSA: 2.2 m2  Height: 69 in  Weight: 237 lb  HR: 70  BP: 126/80 mmHg  _____________________________________________________________________________  __        Procedure  Treadmill stress test.  _____________________________________________________________________________  __        Interpretation Summary     This was a normal stress EKG with no evidence of stress-induced ischemia.  Average exercise tolerance ( 09:00 Nghia protocol / 10.2 Mets)  _____________________________________________________________________________  __     Baseline  The patient is in normal sinus rhythm.  The baseline ECG displays diffuse abnormal  ST segments.     Stress  The patient exercised 9:00.  RPP 27,400.  Exercise was stopped due to fatigue.  There was a borderline hypertensive BP response to exercise.  The patient exhibited no chest pain during exercise.  Target Heart Rate was achieved.  A moderate workload was achieved.  The Duke treadmill score was low risk ( >5 Duke score).  This was a normal stress EKG with no evidence of stress-induced ischemia.     Stress Results             Protocol:  Nghia          Maximum Predicted HR:   164 bpm             Target HR: 139 bpm        % Maximum Predicted HR: 84 %                        DurationHeart Rate              Stage  (mm:ss)   (bpm)     BP           Comment             Stage 1   3:00      94    150/80             Stage 2   3:00     114    170/80             Stage 3   3:00     137    200/80RPP: 27,400 DUKE SCORE: 9            RECOVERYR  6:00     100    160/70FAC: AVERAGE               Stress Duration:   9:00 mm:ss *        Recovery Time: 6:00 mm:ss         Maximum Stress HR: 137 bpm *           METS:          10     _____________________________________________________________________________  __     _____________________________________________________________________________  __        Report approved by: Dr. Franko Brandon 01/02/2019 12:26 PM

## 2019-01-15 PROBLEM — M25.511 RIGHT SHOULDER PAIN: Status: RESOLVED | Noted: 2018-08-20 | Resolved: 2019-01-15

## 2019-02-05 ENCOUNTER — TRANSFERRED RECORDS (OUTPATIENT)
Dept: HEALTH INFORMATION MANAGEMENT | Facility: CLINIC | Age: 59
End: 2019-02-05

## 2019-03-01 ENCOUNTER — MYC REFILL (OUTPATIENT)
Dept: PEDIATRICS | Facility: CLINIC | Age: 59
End: 2019-03-01

## 2019-03-01 DIAGNOSIS — M10.079 IDIOPATHIC GOUT INVOLVING TOE, UNSPECIFIED CHRONICITY, UNSPECIFIED LATERALITY: ICD-10-CM

## 2019-03-05 RX ORDER — ALLOPURINOL 100 MG/1
TABLET ORAL
Qty: 180 TABLET | Refills: 1 | Status: SHIPPED | OUTPATIENT
Start: 2019-03-05 | End: 2019-09-26

## 2019-03-05 RX ORDER — ALLOPURINOL 300 MG/1
300 TABLET ORAL DAILY
Qty: 90 TABLET | Refills: 1 | Status: SHIPPED | OUTPATIENT
Start: 2019-03-05 | End: 2019-09-26

## 2019-03-05 NOTE — TELEPHONE ENCOUNTER
"Requested Prescriptions   Pending Prescriptions Disp Refills     allopurinol (ZYLOPRIM) 300 MG tablet 90 tablet 2     Sig: Take 1 tablet (300 mg) by mouth daily    Gout Agents Protocol Passed - 3/1/2019  5:32 PM       Passed - CBC on file in past 12 months    Recent Labs   Lab Test 08/20/18  1335   WBC 4.9   RBC 4.58   HGB 14.4   HCT 41.2                   Passed - ALT on file in past 12 months    Recent Labs   Lab Test 07/16/18  1354   ALT 38            Passed - Has Uric Acid on file in past 12 months and value is less than 6    Recent Labs   Lab Test 08/20/18  1335   URIC 4.5     If level is 6mg/dL or greater, ok to refill one time and refer to provider.          Passed - Recent (12 mo) or future (30 days) visit within the authorizing provider's specialty    Patient had office visit in the last 12 months or has a visit in the next 30 days with authorizing provider or within the authorizing provider's specialty.  See \"Patient Info\" tab in inbasket, or \"Choose Columns\" in Meds & Orders section of the refill encounter.             Passed - Medication is active on med list       Passed - Patient is age 18 or older       Passed - Normal serum creatinine on file in the past 12 months    Recent Labs   Lab Test 07/16/18  1354   CR 0.76             allopurinol (ZYLOPRIM) 100 MG tablet 180 tablet 1     Sig: Take 2 tabs daily with 300mg for total of 500mg    Gout Agents Protocol Passed - 3/1/2019  5:32 PM       Passed - CBC on file in past 12 months    Recent Labs   Lab Test 08/20/18  1335   WBC 4.9   RBC 4.58   HGB 14.4   HCT 41.2                   Passed - ALT on file in past 12 months    Recent Labs   Lab Test 07/16/18  1354   ALT 38            Passed - Has Uric Acid on file in past 12 months and value is less than 6    Recent Labs   Lab Test 08/20/18  1335   URIC 4.5     If level is 6mg/dL or greater, ok to refill one time and refer to provider.          Passed - Recent (12 mo) or future (30 days) visit " "within the authorizing provider's specialty    Patient had office visit in the last 12 months or has a visit in the next 30 days with authorizing provider or within the authorizing provider's specialty.  See \"Patient Info\" tab in inbasket, or \"Choose Columns\" in Meds & Orders section of the refill encounter.             Passed - Medication is active on med list       Passed - Patient is age 18 or older       Passed - Normal serum creatinine on file in the past 12 months    Recent Labs   Lab Test 07/16/18  1354   CR 0.76               "

## 2019-04-12 ENCOUNTER — MYC REFILL (OUTPATIENT)
Dept: PEDIATRICS | Facility: CLINIC | Age: 59
End: 2019-04-12

## 2019-04-12 DIAGNOSIS — M10.079 IDIOPATHIC GOUT INVOLVING TOE, UNSPECIFIED CHRONICITY, UNSPECIFIED LATERALITY: ICD-10-CM

## 2019-04-15 RX ORDER — ALLOPURINOL 100 MG/1
TABLET ORAL
Qty: 180 TABLET | Refills: 1 | OUTPATIENT
Start: 2019-04-15

## 2019-04-15 RX ORDER — ALLOPURINOL 300 MG/1
300 TABLET ORAL DAILY
Qty: 90 TABLET | Refills: 1 | OUTPATIENT
Start: 2019-04-15

## 2019-04-15 NOTE — TELEPHONE ENCOUNTER
Last refilled 3/5/19 for #90 with 1  Duplicate note sent to the pharmacy.  Lina Sandoval, HEMA  Message handled by Nurse Triage.

## 2019-06-23 ENCOUNTER — OFFICE VISIT (OUTPATIENT)
Dept: URGENT CARE | Facility: URGENT CARE | Age: 59
End: 2019-06-23
Payer: COMMERCIAL

## 2019-06-23 VITALS
TEMPERATURE: 98 F | HEART RATE: 62 BPM | SYSTOLIC BLOOD PRESSURE: 130 MMHG | BODY MASS INDEX: 33.89 KG/M2 | WEIGHT: 229.5 LBS | OXYGEN SATURATION: 96 % | DIASTOLIC BLOOD PRESSURE: 84 MMHG

## 2019-06-23 DIAGNOSIS — H60.11 CELLULITIS OF RIGHT EAR: Primary | ICD-10-CM

## 2019-06-23 DIAGNOSIS — H61.891 SWELLING OF RIGHT EXTERNAL EAR: ICD-10-CM

## 2019-06-23 PROCEDURE — 99214 OFFICE O/P EST MOD 30 MIN: CPT | Performed by: PHYSICIAN ASSISTANT

## 2019-06-23 RX ORDER — PREDNISONE 20 MG/1
20 TABLET ORAL DAILY
Qty: 5 TABLET | Refills: 0 | Status: SHIPPED | OUTPATIENT
Start: 2019-06-23 | End: 2019-06-28

## 2019-06-23 RX ORDER — CEPHALEXIN 500 MG/1
500 CAPSULE ORAL 3 TIMES DAILY
Qty: 21 CAPSULE | Refills: 0 | Status: SHIPPED | OUTPATIENT
Start: 2019-06-23 | End: 2019-06-30

## 2019-07-17 NOTE — PROGRESS NOTES
SUBJECTIVE:  Pavel Greenwood is a 59 year old male who presents with right ear external redness, swelling and warmth to the touch   for 1 day(s).   No fevers.  Hearing intact.  Denies known trauma to ear.  He feels generally well and no hx of MRSA  Severity: moderate   Timing:gradual onset and still present  Additional symptoms include none.      History of recurrent otitis: as child with OM    Past Medical History:   Diagnosis Date     Colon polyps      Gout      Otitis media     recurrent, s/p multiple PE tubes as a child     Current Outpatient Medications   Medication Sig Dispense Refill     allopurinol (ZYLOPRIM) 100 MG tablet Take 2 tabs daily with 300mg for total of 500mg 180 tablet 1     allopurinol (ZYLOPRIM) 300 MG tablet Take 1 tablet (300 mg) by mouth daily 90 tablet 1     colchicine (COLCRYS) 0.6 MG tablet TAKE TWO TABLETS BY MOUTH AT THE FIRST SIGN OF FLARE, TAKE ONE ADDITIONAL TABLET ONE HOUR LATER 30 tablet 0     indomethacin (INDOCIN) 25 MG capsule Take 1 capsule (25 mg) by mouth 3 times daily (with meals) 30 capsule 0     order for DME Equipment being ordered: right thumb spica splint 1 Device 0     Social History     Tobacco Use     Smoking status: Former Smoker     Last attempt to quit: 2008     Years since quittin.1     Smokeless tobacco: Never Used   Substance Use Topics     Alcohol use: Yes     Alcohol/week: 4.8 oz     Types: 8 Standard drinks or equivalent per week     Comment: 6-7 drinks per week        ROS:   Review of systems negative except as stated above.    OBJECTIVE:  /84   Pulse 62   Temp 98  F (36.7  C) (Tympanic)   Wt 104.1 kg (229 lb 8 oz)   SpO2 96%   BMI 33.89 kg/m     EXAM:  The right TM is normal: no effusions, no erythema, and normal landmarks     The right auditory canal is  External ear red, swollen and hot the to touch.  No abscess or drainage noted.  Non tender   Mastoid normal   The left TM is normal: no effusions, no erythema, and normal  landmarks  The left auditory canal is normal and without drainage, edema or erythema  Oropharynx exam is normal: no lesions, erythema, adenopathy or exudate.  GENERAL: no acute distress  EYES: EOMI,  PERRL, conjunctiva clear  NECK: supple, non-tender to palpation, no adenopathy noted  RESP: lungs clear to auscultation - no rales, rhonchi or wheezes  CV: regular rates and rhythm, normal S1 S2, no murmur noted  SKIN: no suspicious lesions or rashes     assessment/plan:  (H60.11) Cellulitis of right ear  (primary encounter diagnosis)  Comment:   Plan: cephALEXin (KEFLEX) 500 MG capsule          Med as directed and OTC med for sx relief.  Signs of spreading infection discussed and to Follow-up with PCP as needed.      (H61.891) Swelling of right external ear  Comment:   Plan: predniSONE (DELTASONE) 20 MG tablet         Short burst of Prednisone and side affects reviewed.

## 2019-09-26 ENCOUNTER — MYC REFILL (OUTPATIENT)
Dept: PEDIATRICS | Facility: CLINIC | Age: 59
End: 2019-09-26

## 2019-09-26 DIAGNOSIS — M10.079 IDIOPATHIC GOUT INVOLVING TOE, UNSPECIFIED CHRONICITY, UNSPECIFIED LATERALITY: ICD-10-CM

## 2019-09-26 RX ORDER — ALLOPURINOL 100 MG/1
TABLET ORAL
Qty: 180 TABLET | Refills: 1 | Status: CANCELLED | OUTPATIENT
Start: 2019-09-26

## 2019-09-26 RX ORDER — ALLOPURINOL 100 MG/1
TABLET ORAL
Qty: 60 TABLET | Refills: 0 | Status: SHIPPED | OUTPATIENT
Start: 2019-09-26 | End: 2019-10-28

## 2019-09-26 RX ORDER — ALLOPURINOL 300 MG/1
300 TABLET ORAL DAILY
Qty: 90 TABLET | Refills: 1 | Status: CANCELLED | OUTPATIENT
Start: 2019-09-26

## 2019-09-26 RX ORDER — ALLOPURINOL 300 MG/1
300 TABLET ORAL DAILY
Qty: 30 TABLET | Refills: 0 | Status: SHIPPED | OUTPATIENT
Start: 2019-09-26 | End: 2019-10-28

## 2019-09-26 NOTE — TELEPHONE ENCOUNTER
Routing refill request to provider for review/approval because:  Labs not current:  See protocol labs below  Also, the 300 mg Allopurinol dose does not include the total dose patient should take each day. (the 100 mg Rx shows total dose is 500 mg)  Ileana Michaels RN

## 2019-09-26 NOTE — TELEPHONE ENCOUNTER
"Requested Prescriptions   Pending Prescriptions Disp Refills     allopurinol (ZYLOPRIM) 300 MG tablet 90 tablet 1     Sig: Take 1 tablet (300 mg) by mouth daily       Gout Agents Protocol Failed - 9/26/2019  1:15 AM        Failed - CBC on file in past 12 months     Recent Labs   Lab Test 08/20/18  1335   WBC 4.9   RBC 4.58   HGB 14.4   HCT 41.2                    Failed - ALT on file in past 12 months     Recent Labs   Lab Test 07/16/18  1354   ALT 38             Failed - Has Uric Acid on file in past 12 months and value is less than 6     Recent Labs   Lab Test 08/20/18  1335   URIC 4.5     If level is 6mg/dL or greater, ok to refill one time and refer to provider.           Failed - Normal serum creatinine on file in the past 12 months     Recent Labs   Lab Test 07/16/18  1354   CR 0.76             Passed - Recent (12 mo) or future (30 days) visit within the authorizing provider's specialty     Patient had office visit in the last 12 months or has a visit in the next 30 days with authorizing provider or within the authorizing provider's specialty.  See \"Patient Info\" tab in inbasket, or \"Choose Columns\" in Meds & Orders section of the refill encounter.              Passed - Medication is active on med list        Passed - Patient is age 18 or older        allopurinol (ZYLOPRIM) 100 MG tablet 180 tablet 1     Sig: Take 2 tabs daily with 300mg for total of 500mg       Gout Agents Protocol Failed - 9/26/2019  1:15 AM        Failed - CBC on file in past 12 months     Recent Labs   Lab Test 08/20/18  1335   WBC 4.9   RBC 4.58   HGB 14.4   HCT 41.2                    Failed - ALT on file in past 12 months     Recent Labs   Lab Test 07/16/18  1354   ALT 38             Failed - Has Uric Acid on file in past 12 months and value is less than 6     Recent Labs   Lab Test 08/20/18  1335   URIC 4.5     If level is 6mg/dL or greater, ok to refill one time and refer to provider.           Failed - Normal serum " "creatinine on file in the past 12 months     Recent Labs   Lab Test 07/16/18  1354   CR 0.76             Passed - Recent (12 mo) or future (30 days) visit within the authorizing provider's specialty     Patient had office visit in the last 12 months or has a visit in the next 30 days with authorizing provider or within the authorizing provider's specialty.  See \"Patient Info\" tab in inbasket, or \"Choose Columns\" in Meds & Orders section of the refill encounter.              Passed - Medication is active on med list        Passed - Patient is age 18 or older          "

## 2020-01-05 ASSESSMENT — ENCOUNTER SYMPTOMS
NAUSEA: 0
DIARRHEA: 0
CHILLS: 0
CONSTIPATION: 0
SHORTNESS OF BREATH: 0
PALPITATIONS: 0
HEARTBURN: 0
DIZZINESS: 0
COUGH: 0
NERVOUS/ANXIOUS: 0
JOINT SWELLING: 0
HEMATOCHEZIA: 0
ABDOMINAL PAIN: 0
ARTHRALGIAS: 0
HEADACHES: 0
SORE THROAT: 0
WEAKNESS: 0
HEMATURIA: 0
MYALGIAS: 0
EYE PAIN: 0
DYSURIA: 0
FEVER: 0
FREQUENCY: 0
PARESTHESIAS: 0

## 2020-01-06 ENCOUNTER — OFFICE VISIT (OUTPATIENT)
Dept: PEDIATRICS | Facility: CLINIC | Age: 60
End: 2020-01-06
Payer: COMMERCIAL

## 2020-01-06 VITALS
DIASTOLIC BLOOD PRESSURE: 64 MMHG | WEIGHT: 234.7 LBS | HEIGHT: 70 IN | RESPIRATION RATE: 14 BRPM | SYSTOLIC BLOOD PRESSURE: 128 MMHG | BODY MASS INDEX: 33.6 KG/M2 | TEMPERATURE: 98.3 F | OXYGEN SATURATION: 97 % | HEART RATE: 73 BPM

## 2020-01-06 DIAGNOSIS — M10.079 IDIOPATHIC GOUT INVOLVING TOE, UNSPECIFIED CHRONICITY, UNSPECIFIED LATERALITY: ICD-10-CM

## 2020-01-06 DIAGNOSIS — Z12.5 SCREENING FOR PROSTATE CANCER: ICD-10-CM

## 2020-01-06 DIAGNOSIS — Z00.00 ROUTINE GENERAL MEDICAL EXAMINATION AT A HEALTH CARE FACILITY: Primary | ICD-10-CM

## 2020-01-06 LAB
BASOPHILS # BLD AUTO: 0 10E9/L (ref 0–0.2)
BASOPHILS NFR BLD AUTO: 0.2 %
DIFFERENTIAL METHOD BLD: NORMAL
EOSINOPHIL # BLD AUTO: 0.1 10E9/L (ref 0–0.7)
EOSINOPHIL NFR BLD AUTO: 2.8 %
ERYTHROCYTE [DISTWIDTH] IN BLOOD BY AUTOMATED COUNT: 13.2 % (ref 10–15)
HCT VFR BLD AUTO: 41.8 % (ref 40–53)
HGB BLD-MCNC: 14.8 G/DL (ref 13.3–17.7)
LYMPHOCYTES # BLD AUTO: 1.3 10E9/L (ref 0.8–5.3)
LYMPHOCYTES NFR BLD AUTO: 26.4 %
MCH RBC QN AUTO: 31.5 PG (ref 26.5–33)
MCHC RBC AUTO-ENTMCNC: 35.4 G/DL (ref 31.5–36.5)
MCV RBC AUTO: 89 FL (ref 78–100)
MONOCYTES # BLD AUTO: 0.5 10E9/L (ref 0–1.3)
MONOCYTES NFR BLD AUTO: 9.6 %
NEUTROPHILS # BLD AUTO: 3.1 10E9/L (ref 1.6–8.3)
NEUTROPHILS NFR BLD AUTO: 61 %
PLATELET # BLD AUTO: 251 10E9/L (ref 150–450)
RBC # BLD AUTO: 4.7 10E12/L (ref 4.4–5.9)
WBC # BLD AUTO: 5 10E9/L (ref 4–11)

## 2020-01-06 PROCEDURE — 90750 HZV VACC RECOMBINANT IM: CPT | Performed by: INTERNAL MEDICINE

## 2020-01-06 PROCEDURE — G0103 PSA SCREENING: HCPCS | Performed by: INTERNAL MEDICINE

## 2020-01-06 PROCEDURE — 99396 PREV VISIT EST AGE 40-64: CPT | Mod: 25 | Performed by: INTERNAL MEDICINE

## 2020-01-06 PROCEDURE — 90682 RIV4 VACC RECOMBINANT DNA IM: CPT | Performed by: INTERNAL MEDICINE

## 2020-01-06 PROCEDURE — 80053 COMPREHEN METABOLIC PANEL: CPT | Performed by: INTERNAL MEDICINE

## 2020-01-06 PROCEDURE — 90472 IMMUNIZATION ADMIN EACH ADD: CPT | Performed by: INTERNAL MEDICINE

## 2020-01-06 PROCEDURE — 85025 COMPLETE CBC W/AUTO DIFF WBC: CPT | Performed by: INTERNAL MEDICINE

## 2020-01-06 PROCEDURE — 90471 IMMUNIZATION ADMIN: CPT | Performed by: INTERNAL MEDICINE

## 2020-01-06 PROCEDURE — 36415 COLL VENOUS BLD VENIPUNCTURE: CPT | Performed by: INTERNAL MEDICINE

## 2020-01-06 PROCEDURE — 84550 ASSAY OF BLOOD/URIC ACID: CPT | Performed by: INTERNAL MEDICINE

## 2020-01-06 RX ORDER — ALLOPURINOL 100 MG/1
TABLET ORAL
Qty: 180 TABLET | Refills: 3 | Status: SHIPPED | OUTPATIENT
Start: 2020-01-06 | End: 2020-12-22

## 2020-01-06 RX ORDER — ALLOPURINOL 300 MG/1
1 TABLET ORAL DAILY
Qty: 90 TABLET | Refills: 3 | Status: SHIPPED | OUTPATIENT
Start: 2020-01-06 | End: 2020-12-22

## 2020-01-06 ASSESSMENT — ENCOUNTER SYMPTOMS
ARTHRALGIAS: 0
FEVER: 0
MYALGIAS: 0
HEADACHES: 0
ABDOMINAL PAIN: 0
WEAKNESS: 0
HEMATURIA: 0
SORE THROAT: 0
CHILLS: 0
PARESTHESIAS: 0
NERVOUS/ANXIOUS: 0
HEMATOCHEZIA: 0
DIARRHEA: 0
DYSURIA: 0
COUGH: 0
HEARTBURN: 0
PALPITATIONS: 0
SHORTNESS OF BREATH: 0
NAUSEA: 0
FREQUENCY: 0
DIZZINESS: 0
EYE PAIN: 0
CONSTIPATION: 0
JOINT SWELLING: 0

## 2020-01-06 ASSESSMENT — MIFFLIN-ST. JEOR: SCORE: 1887.9

## 2020-01-06 NOTE — PATIENT INSTRUCTIONS
Due for second dose of your shingles vaccine.     Flu shot today.    Allopurinol refilled for the next year.     Lab work today including screen for prostate cancer. Check cholesterol next year.     Preventive Health Recommendations  Male Ages 50 - 64    Yearly exam:             See your health care provider every year in order to  o   Review health changes.   o   Discuss preventive care.    o   Review your medicines if your doctor has prescribed any.     Have a cholesterol test every 5 years, or more frequently if you are at risk for high cholesterol/heart disease.     Have a diabetes test (fasting glucose) every three years. If you are at risk for diabetes, you should have this test more often.     Have a colonoscopy at age 50, or have a yearly FIT test (stool test). These exams will check for colon cancer.      Talk with your health care provider about whether or not a prostate cancer screening test (PSA) is right for you.    You should be tested each year for STDs (sexually transmitted diseases), if you re at risk.     Shots: Get a flu shot each year. Get a tetanus shot every 10 years.     Nutrition:    Eat at least 5 servings of fruits and vegetables daily.     Eat whole-grain bread, whole-wheat pasta and brown rice instead of white grains and rice.     Get adequate Calcium and Vitamin D.     Lifestyle    Exercise for at least 150 minutes a week (30 minutes a day, 5 days a week). This will help you control your weight and prevent disease.     Limit alcohol to one drink per day.     No smoking.     Wear sunscreen to prevent skin cancer.     See your dentist every six months for an exam and cleaning.     See your eye doctor every 1 to 2 years.

## 2020-01-06 NOTE — PROGRESS NOTES
SUBJECTIVE:   CC: Pavel Greenwood is an 59 year old male who presents for preventative health visit.     Healthy Habits:     Getting at least 3 servings of Calcium per day:  NO    Bi-annual eye exam:  Yes    Dental care twice a year:  Yes    Sleep apnea or symptoms of sleep apnea:  None    Diet:  Regular (no restrictions)    Frequency of exercise:  None    Taking medications regularly:  Yes    Medication side effects:  None    PHQ-2 Total Score: 0    Additional concerns today:  No    Doing well, hasn't had a gout flare in years. Wants to make sure he should still be on allopurinol, worried that this may hurt his kidneys.       Today's PHQ-2 Score:   PHQ-2 (  [employer]) 2020   Q1: Little interest or pleasure in doing things 0   Q2: Feeling down, depressed or hopeless 0   PHQ-2 Score 0   Q1: Little interest or pleasure in doing things Not at all   Q2: Feeling down, depressed or hopeless Not at all   PHQ-2 Score 0       Abuse: Current or Past(Physical, Sexual or Emotional)- No  Do you feel safe in your environment? Yes    Have you ever done Advance Care Planning? (For example, a Health Directive, POLST, or a discussion with a medical provider or your loved ones about your wishes): No, advance care planning information given to patient to review.  Advanced care planning was discussed at today's visit.    Social History     Tobacco Use     Smoking status: Former Smoker     Last attempt to quit: 2008     Years since quittin.6     Smokeless tobacco: Never Used   Substance Use Topics     Alcohol use: Yes     Alcohol/week: 8.0 standard drinks     Types: 8 Standard drinks or equivalent per week     Frequency: 4 or more times a week     Drinks per session: 1 or 2     Binge frequency: Less than monthly     Comment: 6-7 drinks per week      If you drink alcohol do you typically have >3 drinks per day or >7 drinks per week? Yes        AUDIT - Alcohol Use Disorders Identification Test - Reproduced from the World  Health Organization Audit 2001 (Second Edition) 2020   1.  How often do you have a drink containing alcohol? 4 or more times a week   2.  How many drinks containing alcohol do you have on a typical day when you are drinking? 1 or 2   3.  How often do you have five or more drinks on one occasion? Less than monthly   4.  How often during the last year have you found that you were not able to stop drinking once you had started? Never   5.  How often during the last year have you failed to do what was normally expected of you because of drinking? Never   6.  How often during the last year have you needed a first drink in the morning to get yourself going after a heavy drinking session? Never   7.  How often during the last year have you had a feeling of guilt or remorse after drinking? Never   8.  How often during the last year have you been unable to remember what happened the night before because of your drinking? Never   9.  Have you or someone else been injured because of your drinking? No   10. Has a relative, friend, doctor or other health care worker been concerned about your drinking or suggested you cut down? No   TOTAL SCORE 5       Last PSA: No results found for: PSA    Reviewed orders with patient. Reviewed health maintenance and updated orders accordingly - Yes  Patient Active Problem List   Diagnosis     CARDIOVASCULAR SCREENING; LDL GOAL LESS THAN 160     Adenomatous polyp of colon tubovillous- repeat cscope 2016     Idiopathic gout involving toe, unspecified chronicity, unspecified laterality     Past Surgical History:   Procedure Laterality Date     COLONOSCOPY       MYRINGOTOMY BILATERAL       TONSILLECTOMY         Social History     Tobacco Use     Smoking status: Former Smoker     Last attempt to quit: 2008     Years since quittin.6     Smokeless tobacco: Never Used   Substance Use Topics     Alcohol use: Yes     Alcohol/week: 8.0 standard drinks     Types: 8 Standard drinks or  "equivalent per week     Frequency: 4 or more times a week     Drinks per session: 1 or 2     Binge frequency: Less than monthly     Comment: 6-7 drinks per week      Family History   Problem Relation Age of Onset     Anxiety Disorder Paternal Grandmother      Cerebrovascular Disease Brother         small stroke     Other Cancer Maternal Grandfather         lung CA r/t smoking           Reviewed and updated as needed this visit by clinical staff  Tobacco  Allergies  Med Hx  Surg Hx  Fam Hx  Soc Hx        Reviewed and updated as needed this visit by Provider  Meds            Review of Systems   Constitutional: Negative for chills and fever.   HENT: Positive for hearing loss. Negative for congestion, ear pain and sore throat.    Eyes: Negative for pain and visual disturbance.   Respiratory: Negative for cough and shortness of breath.    Cardiovascular: Negative for chest pain, palpitations and peripheral edema.   Gastrointestinal: Negative for abdominal pain, constipation, diarrhea, heartburn, hematochezia and nausea.   Genitourinary: Negative for discharge, dysuria, frequency, genital sores, hematuria, impotence and urgency.   Musculoskeletal: Negative for arthralgias, joint swelling and myalgias.   Skin: Negative for rash.   Neurological: Negative for dizziness, weakness, headaches and paresthesias.   Psychiatric/Behavioral: Negative for mood changes. The patient is not nervous/anxious.          OBJECTIVE:   /64 (BP Location: Right arm, Patient Position: Sitting, Cuff Size: Adult Large)   Pulse 73   Temp 98.3  F (36.8  C) (Tympanic)   Resp 14   Ht 1.765 m (5' 9.5\")   Wt 106.5 kg (234 lb 11.2 oz)   SpO2 97%   BMI 34.16 kg/m      Physical Exam  GENERAL: healthy, alert and no distress  EYES: Eyes grossly normal to inspection, PERRL and conjunctivae and sclerae normal  HENT: ear canals and TM's normal, nose and mouth without ulcers or lesions  NECK: no adenopathy, no asymmetry, masses, or scars and " "thyroid normal to palpation  RESP: lungs clear to auscultation - no rales, rhonchi or wheezes  CV: regular rate and rhythm, normal S1 S2, no S3 or S4, no murmur, click or rub, no peripheral edema and peripheral pulses strong  ABDOMEN: soft, nontender, no hepatosplenomegaly, no masses and bowel sounds normal  MS: no gross musculoskeletal defects noted, no edema  SKIN: no suspicious lesions or rashes  NEURO: Normal strength and tone, mentation intact and speech normal  PSYCH: mentation appears normal, affect normal/bright      ASSESSMENT/PLAN:   1. Routine general medical examination at a health care facility  Counseling as below.   - Comprehensive metabolic panel (BMP + Alb, Alk Phos, ALT, AST, Total. Bili, TP)    2. Idiopathic gout involving toe, unspecified chronicity, unspecified laterality  Well controlled on allopurinol; reviewed medication risks vs risk for flare with stopping. Patient elects to remain on current dose of medication.   - CBC with platelets differential  - Uric acid  - allopurinol (ZYLOPRIM) 100 MG tablet; TAKE TWO TABLETS BY MOUTH ONCE DAILY WITH 300MG FOR TOTAL DAILY DOSE OF 500MG.  Dispense: 180 tablet; Refill: 3  - allopurinol (ZYLOPRIM) 300 MG tablet; Take 1 tablet (300 mg) by mouth daily With 200mg for a daily total of 500mg.  Dispense: 90 tablet; Refill: 3    3. Screening for prostate cancer  - PSA, screen    COUNSELING:   Reviewed preventive health counseling, as reflected in patient instructions       Regular exercise       Healthy diet/nutrition       Vision screening       Hearing screening       Colon cancer screening       Prostate cancer screening    Estimated body mass index is 34.16 kg/m  as calculated from the following:    Height as of this encounter: 1.765 m (5' 9.5\").    Weight as of this encounter: 106.5 kg (234 lb 11.2 oz).     Weight management plan: Discussed healthy diet and exercise guidelines     reports that he quit smoking about 11 years ago. He has never used " smokeless tobacco.      Counseling Resources:  ATP IV Guidelines  Pooled Cohorts Equation Calculator  FRAX Risk Assessment  ICSI Preventive Guidelines  Dietary Guidelines for Americans, 2010  USDA's MyPlate  ASA Prophylaxis  Lung CA Screening    Janet Mueller MD  [facility]

## 2020-01-07 LAB
ALBUMIN SERPL-MCNC: 3.9 G/DL (ref 3.4–5)
ALP SERPL-CCNC: 70 U/L (ref 40–150)
ALT SERPL W P-5'-P-CCNC: 38 U/L (ref 0–70)
ANION GAP SERPL CALCULATED.3IONS-SCNC: 7 MMOL/L (ref 3–14)
AST SERPL W P-5'-P-CCNC: 17 U/L (ref 0–45)
BILIRUB SERPL-MCNC: 0.5 MG/DL (ref 0.2–1.3)
BUN SERPL-MCNC: 20 MG/DL (ref 7–30)
CALCIUM SERPL-MCNC: 9.2 MG/DL (ref 8.5–10.1)
CHLORIDE SERPL-SCNC: 107 MMOL/L (ref 94–109)
CO2 SERPL-SCNC: 28 MMOL/L (ref 20–32)
CREAT SERPL-MCNC: 0.82 MG/DL (ref 0.66–1.25)
GFR SERPL CREATININE-BSD FRML MDRD: >90 ML/MIN/{1.73_M2}
GLUCOSE SERPL-MCNC: 89 MG/DL (ref 70–99)
POTASSIUM SERPL-SCNC: 4.4 MMOL/L (ref 3.4–5.3)
PROT SERPL-MCNC: 7.2 G/DL (ref 6.8–8.8)
PSA SERPL-ACNC: 0.78 UG/L (ref 0–4)
SODIUM SERPL-SCNC: 142 MMOL/L (ref 133–144)
URATE SERPL-MCNC: 4.4 MG/DL (ref 3.5–7.2)

## 2020-12-14 ENCOUNTER — HEALTH MAINTENANCE LETTER (OUTPATIENT)
Age: 60
End: 2020-12-14

## 2020-12-20 DIAGNOSIS — M10.079 IDIOPATHIC GOUT INVOLVING TOE, UNSPECIFIED CHRONICITY, UNSPECIFIED LATERALITY: ICD-10-CM

## 2020-12-20 NOTE — LETTER
[facility]  [address]  [address]  [place] [address]  Phone: [phone]  Fax: [phone]        December 29, 2020      Pavel Greenwood                                                                                                                                [address]    [place] [address]            Dear Mr. Greenwood,    We have attempted to reach you multiple times because we are concerned about your health care. We recently provided you with a medication refill. Many medications require routine follow-up with your Doctor.      At this time we ask that: You schedule an appointment for your annual physical.    Your prescription: Has been refilled for 90 days so you may have time for the above noted follow-up. You will need an appointment prior to further refills.     Please call our scheduling line at [phone]. If you have any questions or concerns, you can call this number as well.      Thank you,      [facility] Care Team

## 2020-12-22 RX ORDER — ALLOPURINOL 100 MG/1
TABLET ORAL
Qty: 180 TABLET | Refills: 0 | Status: SHIPPED | OUTPATIENT
Start: 2020-12-22 | End: 2021-03-17

## 2020-12-22 RX ORDER — ALLOPURINOL 300 MG/1
TABLET ORAL
Qty: 90 TABLET | Refills: 0 | Status: SHIPPED | OUTPATIENT
Start: 2020-12-22 | End: 2021-03-17

## 2020-12-22 NOTE — TELEPHONE ENCOUNTER
Medication is being filled for 1 time refill only due to:  Patient needs to be seen because it has been more than one year since last visit.  Prescription approved per [facility] Refill Protocol.  Routed to  for scheduling  Charline Green RN, BSN  Message handled by CLINIC NURSE.

## 2020-12-28 NOTE — TELEPHONE ENCOUNTER
2nd attempt: left VM to return call. If pt calls back, please assist in scheduling annual physical with PCP. Rx refilled for 90 days only, needs appointment prior to further refills.    Aga Marin MA 9:49 AM 12/28/2020

## 2021-01-14 ENCOUNTER — OFFICE VISIT (OUTPATIENT)
Dept: PEDIATRICS | Facility: CLINIC | Age: 61
End: 2021-01-14
Payer: COMMERCIAL

## 2021-01-14 VITALS
SYSTOLIC BLOOD PRESSURE: 116 MMHG | RESPIRATION RATE: 20 BRPM | HEART RATE: 64 BPM | TEMPERATURE: 98.6 F | BODY MASS INDEX: 33.07 KG/M2 | WEIGHT: 223.3 LBS | HEIGHT: 69 IN | DIASTOLIC BLOOD PRESSURE: 78 MMHG

## 2021-01-14 DIAGNOSIS — Z87.891 PERSONAL HISTORY OF TOBACCO USE: ICD-10-CM

## 2021-01-14 DIAGNOSIS — Z00.00 ROUTINE GENERAL MEDICAL EXAMINATION AT A HEALTH CARE FACILITY: Primary | ICD-10-CM

## 2021-01-14 DIAGNOSIS — M10.079 IDIOPATHIC GOUT INVOLVING TOE, UNSPECIFIED CHRONICITY, UNSPECIFIED LATERALITY: ICD-10-CM

## 2021-01-14 PROCEDURE — 99396 PREV VISIT EST AGE 40-64: CPT | Mod: GC | Performed by: STUDENT IN AN ORGANIZED HEALTH CARE EDUCATION/TRAINING PROGRAM

## 2021-01-14 PROCEDURE — G0296 VISIT TO DETERM LDCT ELIG: HCPCS | Performed by: STUDENT IN AN ORGANIZED HEALTH CARE EDUCATION/TRAINING PROGRAM

## 2021-01-14 ASSESSMENT — MIFFLIN-ST. JEOR: SCORE: 1823.26

## 2021-01-14 NOTE — PROGRESS NOTES
SUBJECTIVE:   CC: Pavel Greenwood is an 60 year old male who presents for preventative health visit.     {Split Bill scripting  The purpose of this visit is to discuss your medical history and prevent health problems before you are sick. You may be responsible for a co-pay, coinsurance, or deductible if your visit today includes services such as checking on a sore throat, having an x-ray or lab test, or treating and evaluating a new or existing condition :806898}  Patient has been advised of split billing requirements and indicates understanding: {Yes and No:159642}  Healthy Habits:     Taking medications regularly:  0    PHQ-2 Total Score: 0  History of Present Illness   He consumes 0 sweetened beverage(s) daily. He exercises with enough effort to increase his heart rate 5 days per week.   He is taking medications regularly.    {Add if <65 person on Medicare  - Required Questions (Optional):754657}  {Outside tests to abstract? :886488}    {additional problems to add (Optional):341658}    Today's PHQ-2 Score:   PHQ-2 (  [employer]) 2021   Q1: Little interest or pleasure in doing things 0   Q2: Feeling down, depressed or hopeless 0   PHQ-2 Score 0   Q1: Little interest or pleasure in doing things Not at all   Q2: Feeling down, depressed or hopeless Not at all   PHQ-2 Score 0       Abuse: Current or Past(Physical, Sexual or Emotional)- { :717546}  Do you feel safe in your environment? { :110080}        Social History     Tobacco Use     Smoking status: Former Smoker     Quit date: 2008     Years since quittin.6     Smokeless tobacco: Never Used   Substance Use Topics     Alcohol use: Yes     Alcohol/week: 8.0 standard drinks     Types: 8 Standard drinks or equivalent per week     Frequency: 4 or more times a week     Drinks per session: 1 or 2     Binge frequency: Less than monthly     Comment: 6-7 drinks per week      {Rooming Staff- Complete this question if Prescreen response is not shown below for  "today's visit. If you drink alcohol do you typically have >3 drinks per day or >7 drinks per week? (Optional):506743}    Alcohol Use 1/6/2020   Prescreen: >3 drinks/day or >7 drinks/week? -   Prescreen: >3 drinks/day or >7 drinks/week? { AUDIT responses all:TXT,03623}   AUDIT SCORE  -   {add AUDIT responses (Optional) (A score of 7 for adult men is an indication of hazardous drinking; a score of 8 or more is an indication of an alcohol use disorder.  A score of 7 or more for adult women is an indication of hazardous drinking or an alchohol use disorder):130126}    Last PSA:   PSA   Date Value Ref Range Status   01/06/2020 0.78 0 - 4 ug/L Final     Comment:     Assay Method:  Chemiluminescence using Siemens Vista analyzer       Reviewed orders with patient. Reviewed health maintenance and updated orders accordingly - { :784920::\"Yes\"}  {Chronicprobdata (optional):373677}    Reviewed and updated as needed this visit by clinical staff  Tobacco  Allergies    Med Hx            Reviewed and updated as needed this visit by Provider                {HISTORY OPTIONS (Optional):994728}    Review of Systems  {MALE ROS (Optional):928847::\"CONSTITUTIONAL: NEGATIVE for fever, chills, change in weight\",\"INTEGUMENTARY/SKIN: NEGATIVE for worrisome rashes, moles or lesions\",\"EYES: NEGATIVE for vision changes or irritation\",\"ENT: NEGATIVE for ear, mouth and throat problems\",\"RESP: NEGATIVE for significant cough or SOB\",\"CV: NEGATIVE for chest pain, palpitations or peripheral edema\",\"GI: NEGATIVE for nausea, abdominal pain, heartburn, or change in bowel habits\",\" male: negative for dysuria, hematuria, decreased urinary stream, erectile dysfunction, urethral discharge\",\"MUSCULOSKELETAL: NEGATIVE for significant arthralgias or myalgia\",\"NEURO: NEGATIVE for weakness, dizziness or paresthesias\",\"PSYCHIATRIC: NEGATIVE for changes in mood or affect\"}    OBJECTIVE:   There were no vitals taken for this visit.    Physical Exam  {Exam " "Choices (Optional):353464}    {Diagnostic Test Results (Optional):918870::\"Diagnostic Test Results:\",\"Labs reviewed in Epic\"}    ASSESSMENT/PLAN:   {Diag Picklist:932257}    Patient has been advised of split billing requirements and indicates understanding: {YES / NO:891152::\"Yes\"}  COUNSELING:   {MALE COUNSELING MESSAGES:520431::\"Reviewed preventive health counseling, as reflected in patient instructions\"}    Estimated body mass index is 34.16 kg/m  as calculated from the following:    Height as of 1/6/20: 1.765 m (5' 9.5\").    Weight as of 1/6/20: 106.5 kg (234 lb 11.2 oz).     {Weight Management Plan (ACO) Complete if BMI is abnormal-  Ages 18-64  BMI >24.9.  Age 65+ with BMI <23 or >30 (Optional):452621}    He reports that he quit smoking about 12 years ago. He has never used smokeless tobacco.      Counseling Resources:  ATP IV Guidelines  Pooled Cohorts Equation Calculator  FRAX Risk Assessment  ICSI Preventive Guidelines  Dietary Guidelines for Americans, 2010  USDA's MyPlate  ASA Prophylaxis  Lung CA Screening    Mayito Gentile MD  [facility]  "

## 2021-01-14 NOTE — PROGRESS NOTES
SUBJECTIVE:   CC: Pavel Greenwood is an 60 year old male who presents for preventative health visit.       Patient has been advised of split billing requirements and indicates understanding: Yes  Healthy Habits:     Getting at least 3 servings of Calcium per day:  Yes    Bi-annual eye exam:  NO    Dental care twice a year:  Yes    Sleep apnea or symptoms of sleep apnea:  None    Diet:  Regular (no restrictions)    Frequency of exercise:  4-5 days/week    Duration of exercise:  30-45 minutes    Taking medications regularly:  Yes    Barriers to taking medications:  None    Medication side effects:  None    PHQ-2 Total Score: 0    Additional concerns today:  No  History of Present Illness   He consumes 0 sweetened beverage(s) daily. He exercises with enough effort to increase his heart rate 5 days per week.   He is not taking prescribed medications regularly due to None.          Gout well controlled.     Shared decision making re PSA screen. Asymptomatic without fhx. Declines PSA testing at this time.    Trying to lose weight, has lost 20# in last year intentionally. Will attempt to repeat this year.    New job as a  at [email or web address] at [employer].    Today's PHQ-2 Score: 0  PHQ-2 (  [employer]) 2021   Q1: Little interest or pleasure in doing things 0   Q2: Feeling down, depressed or hopeless 0   PHQ-2 Score 0   Q1: Little interest or pleasure in doing things -   Q2: Feeling down, depressed or hopeless -   PHQ-2 Score -       Abuse: Current or Past(Physical, Sexual or Emotional)- No  Do you feel safe in your environment? Yes        Social History     Tobacco Use     Smoking status: Former Smoker     Quit date: 2008     Years since quittin.6     Smokeless tobacco: Never Used   Substance Use Topics     Alcohol use: Yes     Alcohol/week: 8.0 standard drinks     Types: 8 Standard drinks or equivalent per week     Frequency: 4 or more times a week     Drinks per session: 1 or 2     Binge frequency: Less  than monthly     Comment: 6-7 drinks per week      If you drink alcohol do you typically have >3 drinks per day or >7 drinks per week? No      AUDIT - Alcohol Use Disorders Identification Test - Reproduced from the World Health Organization Audit 2001 (Second Edition) 1/5/2020   1.  How often do you have a drink containing alcohol? 4 or more times a week   2.  How many drinks containing alcohol do you have on a typical day when you are drinking? 1 or 2   3.  How often do you have five or more drinks on one occasion? Less than monthly   4.  How often during the last year have you found that you were not able to stop drinking once you had started? Never   5.  How often during the last year have you failed to do what was normally expected of you because of drinking? Never   6.  How often during the last year have you needed a first drink in the morning to get yourself going after a heavy drinking session? Never   7.  How often during the last year have you had a feeling of guilt or remorse after drinking? Never   8.  How often during the last year have you been unable to remember what happened the night before because of your drinking? Never   9.  Have you or someone else been injured because of your drinking? No   10. Has a relative, friend, doctor or other health care worker been concerned about your drinking or suggested you cut down? No   TOTAL SCORE 5       Last PSA:   PSA   Date Value Ref Range Status   01/06/2020 0.78 0 - 4 ug/L Final     Comment:     Assay Method:  Chemiluminescence using Siemens Vista analyzer       Reviewed orders with patient. Reviewed health maintenance and updated orders accordingly - Yes      Reviewed and updated as needed this visit by clinical staff  Tobacco  Allergies  Meds   Med Hx            Reviewed and updated as needed this visit by Provider                    Review of Systems  CONSTITUTIONAL: NEGATIVE for fever, chills, change in weight  INTEGUMENTARY/SKIN: NEGATIVE for  "worrisome rashes, moles or lesions  EYES: NEGATIVE for vision changes or irritation  ENT: NEGATIVE for ear, mouth and throat problems  RESP: NEGATIVE for significant cough or SOB  CV: NEGATIVE for chest pain, palpitations or peripheral edema  GI: NEGATIVE for nausea, abdominal pain, heartburn, or change in bowel habits   male: negative for dysuria, hematuria, decreased urinary stream, erectile dysfunction, urethral discharge  MUSCULOSKELETAL: NEGATIVE for significant arthralgias or myalgia  NEURO: NEGATIVE for weakness, dizziness or paresthesias  PSYCHIATRIC: NEGATIVE for changes in mood or affect    OBJECTIVE:   /78   Pulse 64   Temp 98.6  F (37  C) (Oral)   Resp 20   Ht 1.753 m (5' 9\")   Wt 101.3 kg (223 lb 4.8 oz)   BMI 32.98 kg/m      Physical Exam  GENERAL: healthy, alert and no distress  EYES: Eyes grossly normal to inspection, PERRL and conjunctivae and sclerae normal  HENT: ear canals and L TM normal, R TM unable to viz d/t cerumen, nose and mouth without ulcers or lesions  NECK: no adenopathy, no asymmetry, masses, or scars and thyroid normal to palpation  RESP: lungs clear to auscultation - no rales, rhonchi or wheezes  CV: regular rate and rhythm, normal S1 S2, no S3 or S4, no murmur, click or rub, no peripheral edema and peripheral pulses strong  ABDOMEN: soft, nontender, no hepatosplenomegaly, no masses and bowel sounds normal  MS: no gross musculoskeletal defects noted, no edema  SKIN: no suspicious lesions or rashes  NEURO: Normal strength and tone, mentation intact and speech normal  PSYCH: mentation appears normal, affect normal/bright    Diagnostic Test Results:  Labs reviewed in Epic  Ordered future fasting labs as stated below.    ASSESSMENT/PLAN:   (Z00.00) Routine general medical examination at a health care facility  (primary encounter diagnosis)  Comment: will order fasting labs for future. utd for vaccines and colonoscopy. Given advice for covid vaccines. Suggested cutting down " "on etoh. Pt understands concerns.  Plan: Lipid panel reflex to direct LDL Fasting,         Glucose            (M10.079) Idiopathic gout involving toe, unspecified chronicity, unspecified laterality  Comment: no flares recently. Does not need refills at present. Should be able to get as needed.  Plan: Uric acid  - ok to refill allopurinol if needed.            Personal hx tobacco use  Comment: used shared decision making to confirm pt interest in screening for lung cancer given 30 pack year hx and quitting 12 years ago. Pt asymptomatic.   - ordered low dose chest ct w/out contrast      Patient has been advised of split billing requirements and indicates understanding: Yes  COUNSELING:   Reviewed preventive health counseling, as reflected in patient instructions  Special attention given to:        Regular exercise       Healthy diet/nutrition       Hearing screening       Immunizations    utd             Alcohol Use        Colon cancer screening       Prostate cancer screening    Estimated body mass index is 32.98 kg/m  as calculated from the following:    Height as of this encounter: 1.753 m (5' 9\").    Weight as of this encounter: 101.3 kg (223 lb 4.8 oz).     Weight management plan: Discussed healthy diet and exercise guidelines    He reports that he quit smoking about 12 years ago. He has never used smokeless tobacco.      Counseling Resources:  ATP IV Guidelines  Pooled Cohorts Equation Calculator  FRAX Risk Assessment  ICSI Preventive Guidelines  Dietary Guidelines for Americans, 2010  USDA's MyPlate  ASA Prophylaxis  Lung CA Screening    Mayito Gentile MD  [facility]  Lung Cancer Screening Shared Decision Making Visit     Pavel Greenwood is eligible for lung cancer screening on the basis of the information provided in my signed lung cancer screening order.     I have discussed with patient the risks and benefits of screening for lung cancer with low-dose CT.     The risks include:  radiation " exposure: one low dose chest CT has as much ionizing radiation as about 15 chest x-rays or 6 months of background radiation living in [place]    false positives: 96% of positive findings/nodules are NOT cancer, but some might still require additional diagnostic evaluation, including biopsy  over-diagnosis: some slow growing cancers that might never have been clinically significant will be detected and treated unnecessarily     The benefit of early detection of lung cancer is contingent upon adherence to annual screening or more frequent follow up if indicated.     Furthermore, reaping the benefits of screening requires Pavel Greenwood to be willing and physically able to undergo diagnostic procedures, if indicated. Although no specific guide is available for determining severity of comorbidities, it is reasonable to withhold screening in patients who have greater mortality risk from other diseases.     We did discuss that the only way to prevent lung cancer is to not smoke. Smoking cessation counseling was not given.      I did not offer risk estimation using a calculator such as this one:    ShouldIScreen        I have seen this patient and I was present during all critical and key portions of the procedure/exam and immediately available to furnish services during the entire service. I have reviewed the documentation from  and discussed the findings with them, and I agree with the documentation their documentation.      Alfonzo So MD  Internal Medicine and Pediatrics

## 2021-01-14 NOTE — PATIENT INSTRUCTIONS
Come back in a couple weeks for a fasting lab only visit.    Continue to lose weight and not smoke.      Preventive Health Recommendations  Male Ages 50 - 64    Yearly exam:             See your health care provider every year in order to  o   Review health changes.   o   Discuss preventive care.    o   Review your medicines if your doctor has prescribed any.     Have a cholesterol test every 5 years, or more frequently if you are at risk for high cholesterol/heart disease.     Have a diabetes test (fasting glucose) every three years. If you are at risk for diabetes, you should have this test more often.     Have a colonoscopy at age 50, or have a yearly FIT test (stool test). These exams will check for colon cancer.      Talk with your health care provider about whether or not a prostate cancer screening test (PSA) is right for you.    You should be tested each year for STDs (sexually transmitted diseases), if you re at risk.     Shots: Get a flu shot each year. Get a tetanus shot every 10 years.     Nutrition:    Eat at least 5 servings of fruits and vegetables daily.     Eat whole-grain bread, whole-wheat pasta and brown rice instead of white grains and rice.     Get adequate Calcium and Vitamin D.     Lifestyle    Exercise for at least 150 minutes a week (30 minutes a day, 5 days a week). This will help you control your weight and prevent disease.     Limit alcohol to one drink per day.     No smoking.     Wear sunscreen to prevent skin cancer.     See your dentist every six months for an exam and cleaning.     See your eye doctor every 1 to 2 years.    Preventive Health Recommendations  Male Ages 50 - 64    Yearly exam:             See your health care provider every year in order to  o   Review health changes.   o   Discuss preventive care.    o   Review your medicines if your doctor has prescribed any.     Have a cholesterol test every 5 years, or more frequently if you are at risk for high cholesterol/heart  disease.     Have a diabetes test (fasting glucose) every three years. If you are at risk for diabetes, you should have this test more often.     Have a colonoscopy at age 50, or have a yearly FIT test (stool test). These exams will check for colon cancer.      Talk with your health care provider about whether or not a prostate cancer screening test (PSA) is right for you.    You should be tested each year for STDs (sexually transmitted diseases), if you re at risk.     Shots: Get a flu shot each year. Get a tetanus shot every 10 years.     Nutrition:    Eat at least 5 servings of fruits and vegetables daily.     Eat whole-grain bread, whole-wheat pasta and brown rice instead of white grains and rice.     Get adequate Calcium and Vitamin D.     Lifestyle    Exercise for at least 150 minutes a week (30 minutes a day, 5 days a week). This will help you control your weight and prevent disease.     Limit alcohol to one drink per day.     No smoking.     Wear sunscreen to prevent skin cancer.     See your dentist every six months for an exam and cleaning.     See your eye doctor every 1 to 2 years.    Here are two resources we are using to keep people up to date about vaccine prioritization, and how we'll try to let people know when it is their turn:     https://www.[employer].org/Blog/what-you-need-to-know-about-the-covid-19-vaccines    https://[place].gov/covid19/vaccine/overview/index.jsp     Lung Cancer Screening   Frequently Asked Questions  If you are at high-risk for lung cancer, getting screened with low-dose computed tomography (LDCT) every year can help save your life. This handout offers answers to some of the most common questions about lung cancer screening. If you have other questions, please call 2-901-2-PCancer ([phone]).     What is it?  Lung cancer screening uses special X-ray technology to create an image of your lung tissue. The exam is quick and easy and takes less than 10 seconds. We don t give  you any medicine or use any needles. You can eat before and after the exam. You don t need to change your clothes as long as the clothing on your chest doesn t contain metal. But, you do need to be able to hold your breath for at least 6 seconds during the exam.    What is the goal of lung cancer screening?  The goal of lung cancer screening is to save lives. Many times, lung cancer is not found until a person starts having physical symptoms. Lung cancer screening can help detect lung cancer in the earliest stages when it may be easier to treat.    Who should be screened for lung cancer?  We suggest lung cancer screening for anyone who is at high-risk for lung cancer. You are in the high-risk group if you:      are between the ages of 55 and 79, and    have smoked at least 1 pack of cigarettes a day for 30 or more years, and    still smoke or have quit within the past 15 years.    However, if you have a new cough or shortness of breath, you should talk to your doctor before being screened.    Some national lung health advocacy groups also recommend screening for people ages 50 to 79 who have smoked an average of 1 pack of cigarettes a day for 20 years. They must also have at least 1 other risk factor for lung cancer, not including exposure to secondhand smoke. Other risk factors are having had cancer in the past, emphysema, pulmonary fibrosis, COPD, a family history of lung cancer, or exposure to certain materials such as arsenic, asbestos, beryllium, cadmium, chromium, diesel fumes, nickel, radon or silica. Your care team can help you know if you have one of these risk factors.     Why does it matter if I have symptoms?  Certain symptoms can be a sign that you have a condition in your lungs that should be checked and treated by your doctor. These symptoms include fever, chest pain, a new or changing cough, shortness of breath that you have never felt before, coughing up blood or unexplained weight loss. Having any  of these symptoms can greatly affect the results of lung cancer screening.       Should all smokers get an LDCT lung cancer screening exam?  It depends. Lung cancer screening is for a very specific group of men and women who have a history of heavy smoking over a long period of time (see  Who should be screened for lung cancer  above).  I am in the high-risk group, but have been diagnosed with cancer in the past. Is LDCT lung cancer screening right for me?  In some cases, you should not have LDCT lung screening, such as when your doctor is already following your cancer with CT scan studies. Your doctor will help you decide if LDCT lung screening is right for you.  Do I need to have a screening exam every year?  Yes. If you are in the high-risk group described earlier, you should get an LDCT lung cancer screening exam every year until you are 79, or are no longer willing or able to undergo screening and possible procedures to diagnose and treat lung cancer.  How effective is LDCT at preventing death from lung cancer?  Studies have shown that LDCT lung cancer screening can lower the risk of death from lung cancer by 20 percent in people who are at high-risk.  What are the risks?  There are some risks and limitations of LDCT lung cancer screening. We want to make sure you understand the risks and benefits, so please let us know if you have any questions. Your doctor may want to talk with you more about these risks.    Radiation exposure: As with any exam that uses radiation, there is a very small increased risk of cancer. The amount of radiation in LDCT is small--about the same amount a person would get from a mammogram. Your doctor orders the exam when he or she feels the potential benefits outweigh the risks.    False negatives: No test is perfect, including LDCT. It is possible that you may have a medical condition, including lung cancer, that is not found during your exam. This is called a false negative result.     False positives and more testing: LDCT very often finds something in the lung that could be cancer, but in fact is not. This is called a false positive result. False positive tests often cause anxiety. To make sure these findings are not cancer, you may need to have more tests. These tests will be done only if you give us permission. Sometimes patients need a treatment that can have side effects, such as a biopsy. For more information on false positives, see  What can I expect from the results?     Findings not related to lung cancer: Your LDCT exam also takes pictures of areas of your body next to your lungs. In a very small number of cases, the CT scan will show an abnormal finding in one of these areas, such as your kidneys, adrenal glands, liver or thyroid. This finding may not be serious, but you may need more tests. Your doctor can help you decide what other tests you may need, if any.  What can I expect from the results?  About 1 out of 4 LDCT exams will find something that may need more tests. Most of the time, these findings are lung nodules. Lung nodules are very small collections of tissue in the lung. These nodules are very common, and the vast majority--more than 97 percent--are not cancer (benign). Most are normal lymph nodes or small areas of scarring from past infections.  But, if a small lung nodule is found to be cancer, the cancer can be cured more than 90 percent of the time. To know if the nodule is cancer, we may need to get more images before your next yearly screening exam. If the nodule has suspicious features (for example, it is large, has an odd shape or grows over time), we will refer you to a specialist for further testing.  Will my doctor also get the results?  Yes. Your doctor will get a copy of your results.  Is it okay to keep smoking now that there s a cancer screening exam?  No. Tobacco is one of the strongest cancer-causing agents. It causes not only lung cancer, but other cancers  and cardiovascular (heart) diseases as well. The damage caused by smoking builds over time. This means that the longer you smoke, the higher your risk of disease. While it is never too late to quit, the sooner you quit, the better.  Where can I find help to quit smoking?  The best way to prevent lung cancer is to stop smoking. If you have already quit smoking, congratulations and keep it up! For help on quitting smoking, please call [employer] at 7-025-246-WZPN ([phone]) or the American [employer] Society at 1-550.370.8092 to find local resources near you.  One-on-one health coaching:  If you d prefer to work individually with a health care provider on tobacco cessation, we offer:      Medication Therapy Management:  Our specially trained pharmacists work closely with you and your doctor to help you quit smoking.  Call [phone] or [phone] (toll free).     Can Do: Health coaching offered by [facility].  [email or web address]

## 2021-01-15 DIAGNOSIS — M10.079 IDIOPATHIC GOUT INVOLVING TOE, UNSPECIFIED CHRONICITY, UNSPECIFIED LATERALITY: ICD-10-CM

## 2021-01-15 DIAGNOSIS — Z00.00 ROUTINE GENERAL MEDICAL EXAMINATION AT A HEALTH CARE FACILITY: ICD-10-CM

## 2021-01-15 LAB
CHOLEST SERPL-MCNC: 166 MG/DL
GLUCOSE SERPL-MCNC: 95 MG/DL (ref 70–99)
HDLC SERPL-MCNC: 46 MG/DL
LDLC SERPL CALC-MCNC: 88 MG/DL
NONHDLC SERPL-MCNC: 120 MG/DL
TRIGL SERPL-MCNC: 160 MG/DL
URATE SERPL-MCNC: 5.6 MG/DL (ref 3.5–7.2)

## 2021-01-15 PROCEDURE — 84550 ASSAY OF BLOOD/URIC ACID: CPT | Performed by: INTERNAL MEDICINE

## 2021-01-15 PROCEDURE — 80061 LIPID PANEL: CPT | Performed by: INTERNAL MEDICINE

## 2021-01-15 PROCEDURE — 36415 COLL VENOUS BLD VENIPUNCTURE: CPT | Performed by: INTERNAL MEDICINE

## 2021-01-15 PROCEDURE — 82947 ASSAY GLUCOSE BLOOD QUANT: CPT | Performed by: INTERNAL MEDICINE

## 2021-01-21 ENCOUNTER — HOSPITAL ENCOUNTER (OUTPATIENT)
Dept: CT IMAGING | Facility: CLINIC | Age: 61
Discharge: HOME | End: 2021-01-21
Attending: INTERNAL MEDICINE | Admitting: INTERNAL MEDICINE
Payer: COMMERCIAL

## 2021-01-21 DIAGNOSIS — Z87.891 PERSONAL HISTORY OF TOBACCO USE: ICD-10-CM

## 2021-01-21 PROCEDURE — 71271 CT THORAX LUNG CANCER SCR C-: CPT

## 2021-03-14 DIAGNOSIS — M10.079 IDIOPATHIC GOUT INVOLVING TOE, UNSPECIFIED CHRONICITY, UNSPECIFIED LATERALITY: ICD-10-CM

## 2021-03-17 RX ORDER — ALLOPURINOL 100 MG/1
TABLET ORAL
Qty: 180 TABLET | Refills: 1 | Status: SHIPPED | OUTPATIENT
Start: 2021-03-17 | End: 2021-09-08

## 2021-03-17 RX ORDER — ALLOPURINOL 300 MG/1
TABLET ORAL
Qty: 90 TABLET | Refills: 1 | Status: SHIPPED | OUTPATIENT
Start: 2021-03-17 | End: 2021-09-16

## 2021-04-01 ENCOUNTER — IMMUNIZATION (OUTPATIENT)
Dept: NURSING | Facility: CLINIC | Age: 61
End: 2021-04-01
Payer: OTHER GOVERNMENT

## 2021-04-01 PROCEDURE — 0001A PR COVID VAC PFIZER DIL RECON 30 MCG/0.3 ML IM: CPT

## 2021-04-01 PROCEDURE — 91300 PR COVID VAC PFIZER DIL RECON 30 MCG/0.3 ML IM: CPT

## 2021-04-22 ENCOUNTER — IMMUNIZATION (OUTPATIENT)
Dept: NURSING | Facility: CLINIC | Age: 61
End: 2021-04-22
Attending: INTERNAL MEDICINE
Payer: OTHER GOVERNMENT

## 2021-04-22 PROCEDURE — 0002A PR COVID VAC PFIZER DIL RECON 30 MCG/0.3 ML IM: CPT

## 2021-04-22 PROCEDURE — 91300 PR COVID VAC PFIZER DIL RECON 30 MCG/0.3 ML IM: CPT

## 2021-07-01 ENCOUNTER — OFFICE VISIT (OUTPATIENT)
Dept: INTERNAL MEDICINE | Facility: CLINIC | Age: 61
End: 2021-07-01
Payer: COMMERCIAL

## 2021-07-01 VITALS
BODY MASS INDEX: 31.74 KG/M2 | SYSTOLIC BLOOD PRESSURE: 140 MMHG | WEIGHT: 214.9 LBS | HEART RATE: 67 BPM | OXYGEN SATURATION: 97 % | DIASTOLIC BLOOD PRESSURE: 80 MMHG | TEMPERATURE: 97.1 F | RESPIRATION RATE: 16 BRPM

## 2021-07-01 DIAGNOSIS — K42.9 UMBILICAL HERNIA WITHOUT OBSTRUCTION AND WITHOUT GANGRENE: Primary | ICD-10-CM

## 2021-07-01 PROCEDURE — 99213 OFFICE O/P EST LOW 20 MIN: CPT | Performed by: FAMILY MEDICINE

## 2021-07-01 NOTE — PATIENT INSTRUCTIONS
Patient was counseled on signs and symptoms that would require immediate evaluation.  He was also referred to general surgery for consultation.

## 2021-07-01 NOTE — PROGRESS NOTES
Assessment & Plan     Umbilical hernia without obstruction and without gangrene    - GENERAL SURG ADULT REFERRAL; Future             Patient Instructions   Patient was counseled on signs and symptoms that would require immediate evaluation.  He was also referred to general surgery for consultation.      Return if symptoms worsen or fail to improve.    Moo Nielsen MD  [facility] ELPIDIO Bello is a 61 year old who presents for the following health issues     HPI     Abdominal/Flank Pain  Onset/Duration: few months  Description:   Character: belly button extends out, gets bigger throughout the day  Location: pino-umbilical region  Radiation: None  Intensity: no pain but, uncomfortable  Progression of Symptoms:  intermittent  Accompanying Signs & Symptoms: stomach feels week  Fever/Chills: no  Gas/Bloating: no  Nausea: no  Vomitting: no  Diarrhea: no  Constipation: no  Dysuria or Hematuria: no  History:   Trauma: no  Previous similar pain: no  Previous tests done: none  Precipitating factors:   Does the pain change with:     Food: no    Bowel Movement: no    Urination: no   Other factors:  no  Therapies tried and outcome: None (has pushed on it in the past and it seemed to go down a bit).           Review of Systems   Constitutional, HEENT, cardiovascular, pulmonary, gi and gu systems are negative, except as otherwise noted.      Objective    BP (!) 140/80 (BP Location: Right arm, Patient Position: Chair, Cuff Size: Adult Regular)   Pulse 67   Temp 97.1  F (36.2  C) (Temporal)   Resp 16   Wt 97.5 kg (214 lb 14.4 oz)   SpO2 97%   BMI 31.74 kg/m    Body mass index is 31.74 kg/m .  Physical Exam   GENERAL APPEARANCE: healthy, alert, no distress and over weight  ABDOMEN: soft, non-tender and there is an umbilical hernia that is reducible.

## 2021-07-08 ENCOUNTER — OFFICE VISIT (OUTPATIENT)
Dept: SURGERY | Facility: CLINIC | Age: 61
End: 2021-07-08
Payer: COMMERCIAL

## 2021-07-08 VITALS
HEIGHT: 69 IN | HEART RATE: 63 BPM | DIASTOLIC BLOOD PRESSURE: 84 MMHG | SYSTOLIC BLOOD PRESSURE: 122 MMHG | WEIGHT: 214 LBS | BODY MASS INDEX: 31.7 KG/M2 | OXYGEN SATURATION: 97 % | RESPIRATION RATE: 16 BRPM

## 2021-07-08 DIAGNOSIS — K42.9 UMBILICAL HERNIA WITHOUT OBSTRUCTION AND WITHOUT GANGRENE: Primary | ICD-10-CM

## 2021-07-08 PROCEDURE — 99203 OFFICE O/P NEW LOW 30 MIN: CPT | Performed by: SURGERY

## 2021-07-08 ASSESSMENT — MIFFLIN-ST. JEOR: SCORE: 1776.08

## 2021-07-08 NOTE — LETTER
2021       Re: Pavel FRENCH [facility] - 1960    Pavel is a 61 year old male who presents for hernia evaluation.  The patient has noticed a bulge. Pain has not been present.  Symptoms began several months ago.  Symptoms are described as a mild discomfort located in the periumbilical area. He had a recent busy shift which caused the hernia to be mildly irritated.  The patient has not had previous abdominal surgery.  Patient does not report that increased activity/lifting causes pain. Employment does require lifting on occasion, he is the manager at a [employer].     Constipation No  DysuriaNo  Cough No  Smoking No     Pt's chart has been reviewed for PMH, PSH, allergies, medications, social and family history.     ROS:  Pulm:  No shortness of breath, dyspnea on exertion, cough, or hemoptysis  CV:  negative  ABD:  See chief complaint  :  Negative  All other systems on 10 point review are negative.     There were no vitals taken for this visit.  Physical exam:  Patient able to get up on table without difficulty.  abdomen is soft without significant tenderness, masses, organomegaly or guarding  bowel sounds are positive and no caput medusa noted.  There is eversion to the inferior aspect of the umbilical skin which overlays a fat-containing partly reducible umbilical hernia.     Imaging  None     Assesment: umbilical hernia, mildly symptomatic     Plan: The nature of hernias, anatomic considerations, indications and approaches to repair were discussed. Risks of operative intervention were discussed including infection, bleeding, harm to structures as well as recurrence, which is about 5% per decade of life.  Post operative recuperation and activity limitations were reviewed as well.  Given his degree of activity at work, the patient is interested in pursuing repair and we will assist in scheduling this with him.     Moe Kimball MD

## 2021-07-08 NOTE — PROGRESS NOTES
Pavel is a 61 year old male who presents for hernia evaluation.  The patient has noticed a bulge.  Pain has not been present.  Symptoms began several months ago.  Symptoms are described as a mild discomfort located in the periumbilical area. He had a recent busy shift which caused the hernia to be mildly irritated.  The patient has not had previous abdominal surgery.  Patient does not report that increased activity/lifting causes pain. Employment does require lifting on occasion, he is the manager at a [employer].    Constipation No  DysuriaNo  Cough No  Smoking No    Pt's chart has been reviewed for PMH, PSH, allergies, medications, social and family history.    ROS:  Pulm:  No shortness of breath, dyspnea on exertion, cough, or hemoptysis  CV:  negative  ABD:  See chief complaint  :  Negative  All other systems on 10 point review are negative.    There were no vitals taken for this visit.  Physical exam:  Patient able to get up on table without difficulty.  abdomen is soft without significant tenderness, masses, organomegaly or guarding  bowel sounds are positive and no caput medusa noted.  There is eversion to the inferior aspect of the umbilical skin which overlays a fat-containing partly reducible umbilical hernia.    Imaging  None    Assesment: umbilical hernia, mildly symptomatic    Plan: The nature of hernias, anatomic considerations, indications and approaches to repair were discussed.  Risks of operative intervention were discussed including infection, bleeding, harm to structures as well as recurrence, which is about 5% per decade of life.  Post operative recuperation and activity limitations were reviewed as well.  Given his degree of activity at work, the patient is interested in pursuing repair and we will assist in scheduling this with him.    Moe Kimball MD  7/8/2021 8:59 AM    Please route or send letter to:  Primary Care Provider (PCP)

## 2021-09-08 DIAGNOSIS — M10.079 IDIOPATHIC GOUT INVOLVING TOE, UNSPECIFIED CHRONICITY, UNSPECIFIED LATERALITY: ICD-10-CM

## 2021-09-08 RX ORDER — ALLOPURINOL 100 MG/1
TABLET ORAL
Qty: 180 TABLET | Refills: 0 | Status: SHIPPED | OUTPATIENT
Start: 2021-09-08 | End: 2021-12-08

## 2021-09-08 NOTE — TELEPHONE ENCOUNTER
Routing refill request to provider for review/approval because:  Labs not current:  CBC, ALT, creatinine    Visit is up to date. RN will issue one time 90 day phil refill. Please advise on labs.   Leonardo BASSETT RN

## 2021-09-15 DIAGNOSIS — M10.079 IDIOPATHIC GOUT INVOLVING TOE, UNSPECIFIED CHRONICITY, UNSPECIFIED LATERALITY: ICD-10-CM

## 2021-09-16 RX ORDER — ALLOPURINOL 300 MG/1
TABLET ORAL
Qty: 90 TABLET | Refills: 1 | Status: SHIPPED | OUTPATIENT
Start: 2021-09-16 | End: 2022-03-09

## 2021-09-16 NOTE — TELEPHONE ENCOUNTER
Routing refill request to provider for review/approval because:  Labs not current:  CBC, ALT, Cr  Charline Green RN, BSN  Message handled by CLINIC NURSE.

## 2021-10-02 ENCOUNTER — HEALTH MAINTENANCE LETTER (OUTPATIENT)
Age: 61
End: 2021-10-02

## 2021-12-07 DIAGNOSIS — M10.079 IDIOPATHIC GOUT INVOLVING TOE, UNSPECIFIED CHRONICITY, UNSPECIFIED LATERALITY: ICD-10-CM

## 2021-12-08 RX ORDER — ALLOPURINOL 100 MG/1
TABLET ORAL
Qty: 180 TABLET | Refills: 0 | Status: SHIPPED | OUTPATIENT
Start: 2021-12-08 | End: 2022-04-12

## 2021-12-08 NOTE — TELEPHONE ENCOUNTER
Prescription approved per [facility] Refill Protocol (pt on 2 different dosed, other has refills, will extend 1 time)  Charline Green RN, BSN  Message handled by CLINIC NURSE.

## 2022-03-09 DIAGNOSIS — M10.079 IDIOPATHIC GOUT INVOLVING TOE, UNSPECIFIED CHRONICITY, UNSPECIFIED LATERALITY: ICD-10-CM

## 2022-03-09 RX ORDER — ALLOPURINOL 300 MG/1
TABLET ORAL
Qty: 90 TABLET | Refills: 0 | Status: SHIPPED | OUTPATIENT
Start: 2022-03-09 | End: 2022-04-12

## 2022-03-09 NOTE — TELEPHONE ENCOUNTER
Looks like you haven't seen this patient in 2 years? Routing refill request to provider for review/approval because:  Labs not current:  CBC, ALT, Uric Acid, creatinine  Patient needs to be seen because it has been more than 1 year since last office visit.    Leonardo BASSETT RN

## 2022-03-19 ENCOUNTER — HEALTH MAINTENANCE LETTER (OUTPATIENT)
Age: 62
End: 2022-03-19

## 2022-04-12 ENCOUNTER — OFFICE VISIT (OUTPATIENT)
Dept: PEDIATRICS | Facility: CLINIC | Age: 62
End: 2022-04-12
Payer: COMMERCIAL

## 2022-04-12 VITALS
BODY MASS INDEX: 34.05 KG/M2 | OXYGEN SATURATION: 97 % | SYSTOLIC BLOOD PRESSURE: 130 MMHG | HEART RATE: 66 BPM | TEMPERATURE: 97.4 F | RESPIRATION RATE: 16 BRPM | WEIGHT: 229.9 LBS | DIASTOLIC BLOOD PRESSURE: 84 MMHG | HEIGHT: 69 IN

## 2022-04-12 DIAGNOSIS — Z23 HIGH PRIORITY FOR 2019-NCOV VACCINE: ICD-10-CM

## 2022-04-12 DIAGNOSIS — M10.079 IDIOPATHIC GOUT INVOLVING TOE, UNSPECIFIED CHRONICITY, UNSPECIFIED LATERALITY: Primary | ICD-10-CM

## 2022-04-12 LAB
ERYTHROCYTE [DISTWIDTH] IN BLOOD BY AUTOMATED COUNT: 13.4 % (ref 10–15)
HCT VFR BLD AUTO: 43.3 % (ref 40–53)
HGB BLD-MCNC: 15.2 G/DL (ref 13.3–17.7)
MCH RBC QN AUTO: 31.3 PG (ref 26.5–33)
MCHC RBC AUTO-ENTMCNC: 35.1 G/DL (ref 31.5–36.5)
MCV RBC AUTO: 89 FL (ref 78–100)
PLATELET # BLD AUTO: 258 10E3/UL (ref 150–450)
RBC # BLD AUTO: 4.85 10E6/UL (ref 4.4–5.9)
WBC # BLD AUTO: 7.4 10E3/UL (ref 4–11)

## 2022-04-12 PROCEDURE — 36415 COLL VENOUS BLD VENIPUNCTURE: CPT | Performed by: NURSE PRACTITIONER

## 2022-04-12 PROCEDURE — 0054A COVID-19,PF,PFIZER (12+ YRS): CPT | Performed by: NURSE PRACTITIONER

## 2022-04-12 PROCEDURE — 99213 OFFICE O/P EST LOW 20 MIN: CPT | Performed by: NURSE PRACTITIONER

## 2022-04-12 PROCEDURE — 85027 COMPLETE CBC AUTOMATED: CPT | Performed by: NURSE PRACTITIONER

## 2022-04-12 PROCEDURE — 80053 COMPREHEN METABOLIC PANEL: CPT | Performed by: NURSE PRACTITIONER

## 2022-04-12 PROCEDURE — 91305 COVID-19,PF,PFIZER (12+ YRS): CPT | Performed by: NURSE PRACTITIONER

## 2022-04-12 PROCEDURE — 84550 ASSAY OF BLOOD/URIC ACID: CPT | Performed by: NURSE PRACTITIONER

## 2022-04-12 RX ORDER — ALLOPURINOL 300 MG/1
TABLET ORAL
Qty: 90 TABLET | Refills: 3 | Status: SHIPPED | OUTPATIENT
Start: 2022-04-12 | End: 2023-04-18

## 2022-04-12 RX ORDER — ALLOPURINOL 100 MG/1
TABLET ORAL
Qty: 180 TABLET | Refills: 3 | Status: SHIPPED | OUTPATIENT
Start: 2022-04-12 | End: 2023-04-18

## 2022-04-12 NOTE — PROGRESS NOTES
"  Assessment & Plan     Idiopathic gout involving toe, unspecified chronicity, unspecified laterality  Will update labs, no recent flares.  - Uric acid; Future  - CBC with platelets; Future  - Comprehensive metabolic panel (BMP + Alb, Alk Phos, ALT, AST, Total. Bili, TP); Future  - allopurinol (ZYLOPRIM) 100 MG tablet; TAKE 2 TABLETS BY MOUTH ONCE DAILY (WITH 300MG TABLET)  - allopurinol (ZYLOPRIM) 300 MG tablet; TAKE 1 TABLET BY MOUTH DAILY WITH 200MG TABLET (TOTAL DAILY DOSE OF 500MG)  - Uric acid  - CBC with platelets  - Comprehensive metabolic panel (BMP + Alb, Alk Phos, ALT, AST, Total. Bili, TP)    High priority for 2019-nCoV vaccine  - COVID-19,PF,PFIZER (12+ Yrs GRAY LABEL)      Patient Instructions   I will keep you posted on your lab results      Return if symptoms worsen or fail to improve, for Physical Exam.    Raine Sharpe NP  [facility] ENMA Bello is a 62 year old who presents for the following health issues     History of Present Illness       Reason for visit:  Review of Allopurinol medication  Symptoms include:  Gout    He eats 0-1 servings of fruits and vegetables daily.He consumes 1 sweetened beverage(s) daily.He exercises with enough effort to increase his heart rate 10 to 19 minutes per day.  He exercises with enough effort to increase his heart rate 3 or less days per week. He is missing 1 dose(s) of medications per week.  He is not taking prescribed medications regularly due to remembering to take.     Uric Acid   Date Value Ref Range Status   01/15/2021 5.6 3.5 - 7.2 mg/dL Final     Typically takes 500 mg allopurinol  No gout flares in the past 1 year    Review of Systems   Constitutional, HEENT, cardiovascular, pulmonary, gi and gu systems are negative, except as otherwise noted.      Objective    /84   Pulse 66   Temp 97.4  F (36.3  C) (Tympanic)   Resp 16   Ht 1.753 m (5' 9\")   Wt 104.3 kg (229 lb 14.4 oz)   SpO2 97%   BMI 33.95 kg/m    Body " mass index is 33.95 kg/m .  Physical Exam   GENERAL: healthy, alert and no distress  PSYCH: mentation appears normal, affect normal/bright    Results for orders placed or performed in visit on 04/12/22 (from the past 24 hour(s))   CBC with platelets   Result Value Ref Range    WBC Count 7.4 4.0 - 11.0 10e3/uL    RBC Count 4.85 4.40 - 5.90 10e6/uL    Hemoglobin 15.2 13.3 - 17.7 g/dL    Hematocrit 43.3 40.0 - 53.0 %    MCV 89 78 - 100 fL    MCH 31.3 26.5 - 33.0 pg    MCHC 35.1 31.5 - 36.5 g/dL    RDW 13.4 10.0 - 15.0 %    Platelet Count 258 150 - 450 10e3/uL

## 2022-04-13 LAB
ALBUMIN SERPL-MCNC: 3.9 G/DL (ref 3.4–5)
ALP SERPL-CCNC: 81 U/L (ref 40–150)
ALT SERPL W P-5'-P-CCNC: 28 U/L (ref 0–70)
ANION GAP SERPL CALCULATED.3IONS-SCNC: 5 MMOL/L (ref 3–14)
AST SERPL W P-5'-P-CCNC: 15 U/L (ref 0–45)
BILIRUB SERPL-MCNC: 0.4 MG/DL (ref 0.2–1.3)
BUN SERPL-MCNC: 25 MG/DL (ref 7–30)
CALCIUM SERPL-MCNC: 10.1 MG/DL (ref 8.5–10.1)
CHLORIDE BLD-SCNC: 106 MMOL/L (ref 94–109)
CO2 SERPL-SCNC: 28 MMOL/L (ref 20–32)
CREAT SERPL-MCNC: 0.75 MG/DL (ref 0.66–1.25)
GFR SERPL CREATININE-BSD FRML MDRD: >90 ML/MIN/1.73M2
GLUCOSE BLD-MCNC: 98 MG/DL (ref 70–99)
POTASSIUM BLD-SCNC: 4.2 MMOL/L (ref 3.4–5.3)
PROT SERPL-MCNC: 7.5 G/DL (ref 6.8–8.8)
SODIUM SERPL-SCNC: 139 MMOL/L (ref 133–144)
URATE SERPL-MCNC: 5.6 MG/DL (ref 3.5–7.2)

## 2022-07-01 SDOH — HEALTH STABILITY: PHYSICAL HEALTH: ON AVERAGE, HOW MANY MINUTES DO YOU ENGAGE IN EXERCISE AT THIS LEVEL?: 0 MIN

## 2022-07-01 SDOH — ECONOMIC STABILITY: FOOD INSECURITY: WITHIN THE PAST 12 MONTHS, THE FOOD YOU BOUGHT JUST DIDN'T LAST AND YOU DIDN'T HAVE MONEY TO GET MORE.: NEVER TRUE

## 2022-07-01 SDOH — ECONOMIC STABILITY: TRANSPORTATION INSECURITY
IN THE PAST 12 MONTHS, HAS THE LACK OF TRANSPORTATION KEPT YOU FROM MEDICAL APPOINTMENTS OR FROM GETTING MEDICATIONS?: NO

## 2022-07-01 SDOH — ECONOMIC STABILITY: INCOME INSECURITY: HOW HARD IS IT FOR YOU TO PAY FOR THE VERY BASICS LIKE FOOD, HOUSING, MEDICAL CARE, AND HEATING?: NOT HARD AT ALL

## 2022-07-01 SDOH — ECONOMIC STABILITY: FOOD INSECURITY: WITHIN THE PAST 12 MONTHS, YOU WORRIED THAT YOUR FOOD WOULD RUN OUT BEFORE YOU GOT MONEY TO BUY MORE.: NEVER TRUE

## 2022-07-01 SDOH — HEALTH STABILITY: PHYSICAL HEALTH: ON AVERAGE, HOW MANY DAYS PER WEEK DO YOU ENGAGE IN MODERATE TO STRENUOUS EXERCISE (LIKE A BRISK WALK)?: 0 DAYS

## 2022-07-01 SDOH — ECONOMIC STABILITY: INCOME INSECURITY: IN THE LAST 12 MONTHS, WAS THERE A TIME WHEN YOU WERE NOT ABLE TO PAY THE MORTGAGE OR RENT ON TIME?: NO

## 2022-07-01 SDOH — ECONOMIC STABILITY: TRANSPORTATION INSECURITY
IN THE PAST 12 MONTHS, HAS LACK OF TRANSPORTATION KEPT YOU FROM MEETINGS, WORK, OR FROM GETTING THINGS NEEDED FOR DAILY LIVING?: NO

## 2022-07-01 ASSESSMENT — LIFESTYLE VARIABLES
HOW OFTEN DO YOU HAVE SIX OR MORE DRINKS ON ONE OCCASION: NEVER
AUDIT-C TOTAL SCORE: 4
HOW MANY STANDARD DRINKS CONTAINING ALCOHOL DO YOU HAVE ON A TYPICAL DAY: 1 OR 2
HOW OFTEN DO YOU HAVE A DRINK CONTAINING ALCOHOL: 4 OR MORE TIMES A WEEK
SKIP TO QUESTIONS 9-10: 1

## 2022-07-01 ASSESSMENT — SOCIAL DETERMINANTS OF HEALTH (SDOH)
DO YOU BELONG TO ANY CLUBS OR ORGANIZATIONS SUCH AS CHURCH GROUPS UNIONS, FRATERNAL OR ATHLETIC GROUPS, OR SCHOOL GROUPS?: NO
HOW OFTEN DO YOU ATTEND CHURCH OR RELIGIOUS SERVICES?: NEVER
HOW OFTEN DO YOU GET TOGETHER WITH FRIENDS OR RELATIVES?: ONCE A WEEK
IN A TYPICAL WEEK, HOW MANY TIMES DO YOU TALK ON THE PHONE WITH FAMILY, FRIENDS, OR NEIGHBORS?: TWICE A WEEK
ARE YOU MARRIED, WIDOWED, DIVORCED, SEPARATED, NEVER MARRIED, OR LIVING WITH A PARTNER?: NEVER MARRIED

## 2022-07-10 SDOH — HEALTH STABILITY: PHYSICAL HEALTH: ON AVERAGE, HOW MANY MINUTES DO YOU ENGAGE IN EXERCISE AT THIS LEVEL?: 0 MIN

## 2022-07-10 SDOH — HEALTH STABILITY: PHYSICAL HEALTH: ON AVERAGE, HOW MANY DAYS PER WEEK DO YOU ENGAGE IN MODERATE TO STRENUOUS EXERCISE (LIKE A BRISK WALK)?: 0 DAYS

## 2022-07-10 SDOH — ECONOMIC STABILITY: FOOD INSECURITY: WITHIN THE PAST 12 MONTHS, THE FOOD YOU BOUGHT JUST DIDN'T LAST AND YOU DIDN'T HAVE MONEY TO GET MORE.: NEVER TRUE

## 2022-07-10 SDOH — ECONOMIC STABILITY: INCOME INSECURITY: HOW HARD IS IT FOR YOU TO PAY FOR THE VERY BASICS LIKE FOOD, HOUSING, MEDICAL CARE, AND HEATING?: NOT HARD AT ALL

## 2022-07-10 SDOH — ECONOMIC STABILITY: INCOME INSECURITY: IN THE LAST 12 MONTHS, WAS THERE A TIME WHEN YOU WERE NOT ABLE TO PAY THE MORTGAGE OR RENT ON TIME?: NO

## 2022-07-10 SDOH — ECONOMIC STABILITY: FOOD INSECURITY: WITHIN THE PAST 12 MONTHS, YOU WORRIED THAT YOUR FOOD WOULD RUN OUT BEFORE YOU GOT MONEY TO BUY MORE.: NEVER TRUE

## 2022-07-10 ASSESSMENT — LIFESTYLE VARIABLES
HOW MANY STANDARD DRINKS CONTAINING ALCOHOL DO YOU HAVE ON A TYPICAL DAY: 1 OR 2
SKIP TO QUESTIONS 9-10: 1
HOW OFTEN DO YOU HAVE SIX OR MORE DRINKS ON ONE OCCASION: NEVER
AUDIT-C TOTAL SCORE: 4
HOW OFTEN DO YOU HAVE A DRINK CONTAINING ALCOHOL: 4 OR MORE TIMES A WEEK

## 2022-07-10 ASSESSMENT — ENCOUNTER SYMPTOMS
ARTHRALGIAS: 0
CHILLS: 0
DIARRHEA: 0
PARESTHESIAS: 0
CONSTIPATION: 0
EYE PAIN: 0
NAUSEA: 0
DIZZINESS: 0
HEADACHES: 0
DYSURIA: 0
JOINT SWELLING: 0
ABDOMINAL PAIN: 0
SHORTNESS OF BREATH: 0
FEVER: 0
PALPITATIONS: 0
WEAKNESS: 0
HEMATURIA: 0
MYALGIAS: 0
HEMATOCHEZIA: 0
SORE THROAT: 0
COUGH: 0
HEARTBURN: 0
NERVOUS/ANXIOUS: 0
FREQUENCY: 0

## 2022-07-10 ASSESSMENT — SOCIAL DETERMINANTS OF HEALTH (SDOH)
HOW OFTEN DO YOU ATTEND CHURCH OR RELIGIOUS SERVICES?: NEVER
DO YOU BELONG TO ANY CLUBS OR ORGANIZATIONS SUCH AS CHURCH GROUPS UNIONS, FRATERNAL OR ATHLETIC GROUPS, OR SCHOOL GROUPS?: NO
HOW OFTEN DO YOU GET TOGETHER WITH FRIENDS OR RELATIVES?: ONCE A WEEK
ARE YOU MARRIED, WIDOWED, DIVORCED, SEPARATED, NEVER MARRIED, OR LIVING WITH A PARTNER?: NEVER MARRIED
IN A TYPICAL WEEK, HOW MANY TIMES DO YOU TALK ON THE PHONE WITH FAMILY, FRIENDS, OR NEIGHBORS?: TWICE A WEEK

## 2022-07-13 ENCOUNTER — OFFICE VISIT (OUTPATIENT)
Dept: PEDIATRICS | Facility: CLINIC | Age: 62
End: 2022-07-13
Payer: COMMERCIAL

## 2022-07-13 VITALS
OXYGEN SATURATION: 97 % | BODY MASS INDEX: 34.24 KG/M2 | WEIGHT: 231.2 LBS | HEIGHT: 69 IN | HEART RATE: 76 BPM | SYSTOLIC BLOOD PRESSURE: 120 MMHG | RESPIRATION RATE: 12 BRPM | DIASTOLIC BLOOD PRESSURE: 66 MMHG | TEMPERATURE: 97.5 F

## 2022-07-13 DIAGNOSIS — Z87.891 PERSONAL HISTORY OF TOBACCO USE: ICD-10-CM

## 2022-07-13 DIAGNOSIS — Z13.220 LIPID SCREENING: ICD-10-CM

## 2022-07-13 DIAGNOSIS — M10.079 IDIOPATHIC GOUT INVOLVING TOE, UNSPECIFIED CHRONICITY, UNSPECIFIED LATERALITY: ICD-10-CM

## 2022-07-13 DIAGNOSIS — Z12.5 SCREENING FOR PROSTATE CANCER: ICD-10-CM

## 2022-07-13 DIAGNOSIS — Z00.00 ROUTINE GENERAL MEDICAL EXAMINATION AT A HEALTH CARE FACILITY: Primary | ICD-10-CM

## 2022-07-13 PROCEDURE — 99396 PREV VISIT EST AGE 40-64: CPT | Performed by: INTERNAL MEDICINE

## 2022-07-13 PROCEDURE — G0296 VISIT TO DETERM LDCT ELIG: HCPCS | Performed by: INTERNAL MEDICINE

## 2022-07-13 ASSESSMENT — ENCOUNTER SYMPTOMS
NAUSEA: 0
PALPITATIONS: 0
NERVOUS/ANXIOUS: 0
CHILLS: 0
DIZZINESS: 0
HEARTBURN: 0
CONSTIPATION: 0
FEVER: 0
ABDOMINAL PAIN: 0
PARESTHESIAS: 0
WEAKNESS: 0
DIARRHEA: 0
ARTHRALGIAS: 0
HEADACHES: 0
SORE THROAT: 0
DYSURIA: 0
JOINT SWELLING: 0
EYE PAIN: 0
MYALGIAS: 0
HEMATURIA: 0
HEMATOCHEZIA: 0
FREQUENCY: 0
SHORTNESS OF BREATH: 0
COUGH: 0

## 2022-07-13 ASSESSMENT — PAIN SCALES - GENERAL: PAINLEVEL: NO PAIN (0)

## 2022-07-13 NOTE — PATIENT INSTRUCTIONS
You will be called to schedule the lung cancer screening scan. This is the last one!    Continue with allopurinol at current dose. Plan for labs next April.     Lung Cancer Screening   Frequently Asked Questions  If you are at high-risk for lung cancer, getting screened with low-dose computed tomography (LDCT) every year can help save your life. This handout offers answers to some of the most common questions about lung cancer screening. If you have other questions, please call [phone] ([phone]).     What is it?  Lung cancer screening uses special X-ray technology to create an image of your lung tissue. The exam is quick and easy and takes less than 10 seconds. We don t give you any medicine or use any needles. You can eat before and after the exam. You don t need to change your clothes as long as the clothing on your chest doesn t contain metal. But, you do need to be able to hold your breath for at least 6 seconds during the exam.    What is the goal of lung cancer screening?  The goal of lung cancer screening is to save lives. Many times, lung cancer is not found until a person starts having physical symptoms. Lung cancer screening can help detect lung cancer in the earliest stages when it may be easier to treat.    Who should be screened for lung cancer?  We suggest lung cancer screening for anyone who is at high-risk for lung cancer. You are in the high-risk group if you:     are between the ages of 55 and 79, and   have smoked at least 1 pack of cigarettes a day for 20 or more years, and   still smoke or have quit within the past 15 years.    However, if you have a new cough or shortness of breath, you should talk to your doctor before being screened.    Why does it matter if I have symptoms?  Certain symptoms can be a sign that you have a condition in your lungs that should be checked and treated by your doctor. These symptoms include fever, chest pain, a new or changing cough, shortness of  breath that you have never felt before, coughing up blood or unexplained weight loss. Having any of these symptoms can greatly affect the results of lung cancer screening.       Should all smokers get an LDCT lung cancer screening exam?  It depends. Lung cancer screening is for a very specific group of men and women who have a history of heavy smoking over a long period of time (see  Who should be screened for lung cancer  above).  I am in the high-risk group, but have been diagnosed with cancer in the past. Is LDCT lung cancer screening right for me?  In some cases, you should not have LDCT lung screening, such as when your doctor is already following your cancer with CT scan studies. Your doctor will help you decide if LDCT lung screening is right for you.  Do I need to have a screening exam every year?  Yes. If you are in the high-risk group described earlier, you should get an LDCT lung cancer screening exam every year until you are 79, or are no longer willing or able to undergo screening and possible procedures to diagnose and treat lung cancer.  How effective is LDCT at preventing death from lung cancer?  Studies have shown that LDCT lung cancer screening can lower the risk of death from lung cancer by 20 percent in people who are at high-risk.  What are the risks?  There are some risks and limitations of LDCT lung cancer screening. We want to make sure you understand the risks and benefits, so please let us know if you have any questions. Your doctor may want to talk with you more about these risks.   Radiation exposure: As with any exam that uses radiation, there is a very small increased risk of cancer. The amount of radiation in LDCT is small--about the same amount a person would get from a mammogram. Your doctor orders the exam when he or she feels the potential benefits outweigh the risks.   False negatives: No test is perfect, including LDCT. It is possible that you may have a medical condition,  including lung cancer, that is not found during your exam. This is called a false negative result.   False positives and more testing: LDCT very often finds something in the lung that could be cancer, but in fact is not. This is called a false positive result. False positive tests often cause anxiety. To make sure these findings are not cancer, you may need to have more tests. These tests will be done only if you give us permission. Sometimes patients need a treatment that can have side effects, such as a biopsy. For more information on false positives, see  What can I expect from the results?    Findings not related to lung cancer: Your LDCT exam also takes pictures of areas of your body next to your lungs. In a very small number of cases, the CT scan will show an abnormal finding in one of these areas, such as your kidneys, adrenal glands, liver or thyroid. This finding may not be serious, but you may need more tests. Your doctor can help you decide what other tests you may need, if any.  What can I expect from the results?  About 1 out of 4 LDCT exams will find something that may need more tests. Most of the time, these findings are lung nodules. Lung nodules are very small collections of tissue in the lung. These nodules are very common, and the vast majority--more than 97 percent--are not cancer (benign). Most are normal lymph nodes or small areas of scarring from past infections.  But, if a small lung nodule is found to be cancer, the cancer can be cured more than 90 percent of the time. To know if the nodule is cancer, we may need to get more images before your next yearly screening exam. If the nodule has suspicious features (for example, it is large, has an odd shape or grows over time), we will refer you to a specialist for further testing.  Will my doctor also get the results?  Yes. Your doctor will get a copy of your results.  Is it okay to keep smoking now that there s a cancer screening exam?  No.  Tobacco is one of the strongest cancer-causing agents. It causes not only lung cancer, but other cancers and cardiovascular (heart) diseases as well. The damage caused by smoking builds over time. This means that the longer you smoke, the higher your risk of disease. While it is never too late to quit, the sooner you quit, the better.  Where can I find help to quit smoking?  The best way to prevent lung cancer is to stop smoking. If you have already quit smoking, congratulations and keep it up! For help on quitting smoking, please call [employer] at 1-887-QUITNOW (1-332.998.1281) or the American [employer] Society at 1-410.650.3615 to find local resources near you.  One-on-one health coaching:  If you d prefer to work individually with a health care provider on tobacco cessation, we offer:     Medication Therapy Management:  Our specially trained pharmacists work closely with you and your doctor to help you quit smoking.  Call [phone] or [phone] (toll free).

## 2022-07-13 NOTE — PROGRESS NOTES
SUBJECTIVE:   CC: Pavel Greenwood is an 62 year old woman who presents for preventive health visit.       Patient has been advised of split billing requirements and indicates understanding: Yes  Healthy Habits:     Getting at least 3 servings of Calcium per day:  Yes    Bi-annual eye exam:  Yes    Dental care twice a year:  Yes    Sleep apnea or symptoms of sleep apnea:  None    Diet:  Regular (no restrictions)    Frequency of exercise:  None    Taking medications regularly:  Yes    Medication side effects:  None    PHQ-2 Total Score: 0    Additional concerns today:  No    Heavy smoker previously - quit 14 years ago in May.     No issues with gout flares, tolerating allopurinol without issues or side effects.       Today's PHQ-2 Score:   PHQ-2 (  [employer]) 7/10/2022   Q1: Little interest or pleasure in doing things 0   Q2: Feeling down, depressed or hopeless 0   PHQ-2 Score 0   PHQ-2 Total Score (12-17 Years)- Positive if 3 or more points; Administer PHQ-A if positive -   Q1: Little interest or pleasure in doing things Not at all   Q2: Feeling down, depressed or hopeless Not at all   PHQ-2 Score 0       Abuse: Current or Past (Physical, Sexual or Emotional) - No  Do you feel safe in your environment? Yes        Social History     Tobacco Use     Smoking status: Former Smoker     Quit date: 2008     Years since quittin.1     Smokeless tobacco: Never Used   Substance Use Topics     Alcohol use: Yes     Alcohol/week: 8.0 standard drinks     Types: 8 Standard drinks or equivalent per week     Comment: A glass of wine every night.  Maybe 9-10 drinks a week.         Alcohol Use 7/10/2022   Prescreen: >3 drinks/day or >7 drinks/week? No   Prescreen: >3 drinks/day or >7 drinks/week? -   AUDIT SCORE  -       Reviewed orders with patient.  Reviewed health maintenance and updated orders accordingly - Yes  Patient Active Problem List   Diagnosis     Adenomatous polyp of colon tubovillous- repeat cscope 2016      Idiopathic gout involving toe, unspecified chronicity, unspecified laterality     Past Surgical History:   Procedure Laterality Date     COLONOSCOPY       MYRINGOTOMY BILATERAL       TONSILLECTOMY         Social History     Tobacco Use     Smoking status: Former Smoker     Quit date: 2008     Years since quittin.1     Smokeless tobacco: Never Used   Substance Use Topics     Alcohol use: Yes     Alcohol/week: 8.0 standard drinks     Types: 8 Standard drinks or equivalent per week     Comment: A glass of wine every night.  Maybe 9-10 drinks a week.     Family History   Problem Relation Age of Onset     Anxiety Disorder Paternal Grandmother      Cerebrovascular Disease Brother         small stroke     Other Cancer Maternal Grandfather         lung CA r/t smoking                Reviewed and updated as needed this visit by clinical staff   Tobacco  Allergies    Med Hx  Surg Hx  Fam Hx          Ernestine Moseley on 2022 at 1:19 PM    Reviewed and updated as needed this visit by Provider     Meds                   Review of Systems   Constitutional: Negative for chills and fever.   HENT: Negative for congestion, ear pain, hearing loss and sore throat.    Eyes: Negative for pain and visual disturbance.   Respiratory: Negative for cough and shortness of breath.    Cardiovascular: Negative for chest pain, palpitations and peripheral edema.   Gastrointestinal: Negative for abdominal pain, constipation, diarrhea, heartburn, hematochezia and nausea.   Genitourinary: Negative for dysuria, frequency, genital sores, hematuria, impotence, penile discharge and urgency.   Musculoskeletal: Negative for arthralgias, joint swelling and myalgias.   Skin: Negative for rash.   Neurological: Negative for dizziness, weakness, headaches and paresthesias.   Psychiatric/Behavioral: Negative for mood changes. The patient is not nervous/anxious.           OBJECTIVE:   /66 (BP Location: Right arm, Patient Position: Sitting,  "Cuff Size: Adult Large)   Pulse 76   Temp 97.5  F (36.4  C) (Temporal)   Resp 12   Ht 1.74 m (5' 8.5\")   Wt 104.9 kg (231 lb 3.2 oz)   SpO2 97%   BMI 34.64 kg/m    Physical Exam  GENERAL: healthy, alert and no distress  EYES: Eyes grossly normal to inspection, PERRL and conjunctivae and sclerae normal  HENT: ear canals and TM's normal, nose and mouth without ulcers or lesions  NECK: no adenopathy, no asymmetry, masses, or scars and thyroid normal to palpation  RESP: lungs clear to auscultation - no rales, rhonchi or wheezes  CV: regular rate and rhythm, normal S1 S2, no S3 or S4, no murmur, click or rub, no peripheral edema and peripheral pulses strong  ABDOMEN: soft, nontender, no hepatosplenomegaly, no masses and bowel sounds normal  MS: no gross musculoskeletal defects noted, no edema  SKIN: no suspicious lesions or rashes  NEURO: Normal strength and tone, mentation intact and speech normal  PSYCH: mentation appears normal, affect normal/bright      ASSESSMENT/PLAN:   1. Routine general medical examination at a health care facility  Counseling as below.     2. Personal history of tobacco use  Meets criteria for lung cancer screening, quit 14 years ago. Will plan to obtain imaging this year, and then likely can discontinue by guidelines.   - Prof fee: Shared Decision Making for Lung Cancer Screening  - CT Chest Lung Cancer Scrn Low Dose wo; Future    3. Idiopathic gout involving toe, unspecified chronicity, unspecified laterality  Good control on current dose of allopurinol, no side effects. Recent labs are all reassuring. Plan to repeat in April of 2023.   - Comprehensive metabolic panel (BMP + Alb, Alk Phos, ALT, AST, Total. Bili, TP); Future  - CBC with platelets and differential; Future  - Uric acid; Future    4. Lipid screening  - Lipid panel reflex to direct LDL Fasting; Future    5. Screening for prostate cancer  - PSA, screen; Future        COUNSELING:  Reviewed preventive health counseling, as " "reflected in patient instructions       Regular exercise       Healthy diet/nutrition       Vision screening       Hearing screening       HIV screeninx in teen years, 1x in adult years, and at intervals if high risk    Estimated body mass index is 34.64 kg/m  as calculated from the following:    Height as of this encounter: 1.74 m (5' 8.5\").    Weight as of this encounter: 104.9 kg (231 lb 3.2 oz).    Weight management plan: Discussed healthy diet and exercise guidelines    He reports that he quit smoking about 14 years ago. He has never used smokeless tobacco.      Counseling Resources:  ATP IV Guidelines  Pooled Cohorts Equation Calculator  Breast Cancer Risk Calculator  BRCA-Related Cancer Risk Assessment: FHS-7 Tool  FRAX Risk Assessment  ICSI Preventive Guidelines  Dietary Guidelines for Americans,   [email or web address]'s MyPlate  ASA Prophylaxis  Lung CA Screening    Janet Mueller MD  [facility]  Lung Cancer Screening Shared Decision Making Visit     Pavel Greenwood, a 62 year old male, is eligible for lung cancer screening    History   Smoking Status     Former Smoker     Quit date: 2008   Smokeless Tobacco     Never Used       I have discussed with patient the risks and benefits of screening for lung cancer with low-dose CT.     The risks include:    radiation exposure: one low dose chest CT has as much ionizing radiation as about 15 chest x-rays, or 6 months of background radiation living in [place]      false positives: most findings/nodules are NOT cancer, but some might still require additional diagnostic evaluation, including biopsy    over-diagnosis: some slow growing cancers that might never have been clinically significant will be detected and treated unnecessarily     The benefit of early detection of lung cancer is contingent upon adherence to annual screening or more frequent follow up if indicated.     Furthermore, to benefit from screening, Pavel must be willing and able " to undergo diagnostic procedures, if indicated. Although no specific guide is available for determining severity of comorbidities, it is reasonable to withhold screening in patients who have greater mortality risk from other diseases.     We did discuss that the best way to prevent lung cancer is to not smoke.    Some patients may value a numeric estimation of lung cancer risk when evaluating if lung cancer screening is right for them, here is one calculator:    ShouldIScreen

## 2022-09-04 ENCOUNTER — HEALTH MAINTENANCE LETTER (OUTPATIENT)
Age: 62
End: 2022-09-04

## 2023-01-04 ENCOUNTER — OFFICE VISIT (OUTPATIENT)
Dept: PEDIATRICS | Facility: CLINIC | Age: 63
End: 2023-01-04
Payer: COMMERCIAL

## 2023-01-04 ENCOUNTER — ANCILLARY PROCEDURE (OUTPATIENT)
Dept: GENERAL RADIOLOGY | Facility: CLINIC | Age: 63
End: 2023-01-04
Attending: NURSE PRACTITIONER
Payer: COMMERCIAL

## 2023-01-04 VITALS
HEART RATE: 68 BPM | SYSTOLIC BLOOD PRESSURE: 126 MMHG | OXYGEN SATURATION: 97 % | RESPIRATION RATE: 20 BRPM | DIASTOLIC BLOOD PRESSURE: 82 MMHG | BODY MASS INDEX: 37.77 KG/M2 | HEIGHT: 69 IN | TEMPERATURE: 97.8 F | WEIGHT: 255 LBS

## 2023-01-04 DIAGNOSIS — Z23 IMMUNIZATION DUE: ICD-10-CM

## 2023-01-04 DIAGNOSIS — M79.672 LEFT FOOT PAIN: ICD-10-CM

## 2023-01-04 DIAGNOSIS — M79.672 LEFT FOOT PAIN: Primary | ICD-10-CM

## 2023-01-04 PROCEDURE — 73630 X-RAY EXAM OF FOOT: CPT | Mod: TC | Performed by: RADIOLOGY

## 2023-01-04 PROCEDURE — 91312 COVID-19 VACCINE BIVALENT BOOSTER 12+ (PFIZER): CPT | Performed by: NURSE PRACTITIONER

## 2023-01-04 PROCEDURE — 90471 IMMUNIZATION ADMIN: CPT | Performed by: NURSE PRACTITIONER

## 2023-01-04 PROCEDURE — 0124A COVID-19 VACCINE BIVALENT BOOSTER 12+ (PFIZER): CPT | Performed by: NURSE PRACTITIONER

## 2023-01-04 PROCEDURE — 90682 RIV4 VACC RECOMBINANT DNA IM: CPT | Performed by: NURSE PRACTITIONER

## 2023-01-04 PROCEDURE — 99213 OFFICE O/P EST LOW 20 MIN: CPT | Mod: 25 | Performed by: NURSE PRACTITIONER

## 2023-01-04 ASSESSMENT — PAIN SCALES - GENERAL: PAINLEVEL: SEVERE PAIN (7)

## 2023-01-04 NOTE — PATIENT INSTRUCTIONS
Try ibuprofen 2-3x per day for the next couple of days to see if this helps (take regularly up to 1 week)    Ice    Good supportive footwear    I will message you the x-ray results    If not improving to see podiatry

## 2023-01-04 NOTE — PROGRESS NOTES
"  Assessment & Plan     Left foot pain  Arthritic changes noted on x-ray. Seems unlikely gout. To see podiatry.  - XR Foot Left G/E 3 Views; Future  - Orthopedic  Referral; Future    Immunization due  - INFLUENZA VACCINE 50-64 OR 18-64 W/EGG ALLERGY (FLUBLOK)  - COVID-19,PF,PFIZER BOOSTER BIVALENT (12+YRS)      Patient Instructions   Try ibuprofen 2-3x per day for the next couple of days to see if this helps (take regularly up to 1 week)    Ice    Good supportive footwear    I will message you the x-ray results    If not improving to see podiatry       Return in about 1 week (around 1/11/2023), or if symptoms worsen or fail to improve.    Raine Sharpe NP  [facility] ENMA Bello is a 62 year old, presenting for the following health issues:  Musculoskeletal Problem and Ear Problem (Rt ear pain x 1 week. Hx of ear infections 2)      Musculoskeletal Problem    History of Present Illness       Reason for visit:  Pain in left foot, possibly a gout flare up but not like any other gout flare I've had.    He eats 2-3 servings of fruits and vegetables daily.He consumes 0 sweetened beverage(s) daily.He exercises with enough effort to increase his heart rate 9 or less minutes per day.  He exercises with enough effort to increase his heart rate 3 or less days per week. He is missing 1 dose(s) of medications per week.  He is not taking prescribed medications regularly due to remembering to take.     Left foot ball of foot  Worst at end of day  About 1.5-2 weeks  No injury  No pain or swelling  Works as     Hx of gout, feels different than this    Review of Systems         Objective    /82   Pulse 68   Temp 97.8  F (36.6  C) (Oral)   Resp 20   Ht 1.753 m (5' 9\")   Wt 115.7 kg (255 lb)   SpO2 97%   BMI 37.66 kg/m    Body mass index is 37.66 kg/m .  Physical Exam   GENERAL: healthy, alert and no distress  MS: LEFT FOOT  Full ROM of foot/ankle, tender to " palpation to mid foot, no obvious deformity, no swelling, no redness    XR Foot Left G/E 3 Views    Result Date: 1/4/2023  LEFT FOOT THREE OR MORE VIEWS  1/4/2023 2:48 PM HISTORY:  Left foot pain. COMPARISON: Radiographs from 10/17/2015.     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 This is new. Pes cavus and tiny calcaneal enthesophytes again noted. Otherwise negative. There is no evidence of fracture. ELBERT SELF MD   SYSTEM ID:  [id number]

## 2023-01-09 ENCOUNTER — OFFICE VISIT (OUTPATIENT)
Dept: PODIATRY | Facility: CLINIC | Age: 63
End: 2023-01-09
Payer: COMMERCIAL

## 2023-01-09 VITALS
HEIGHT: 69 IN | BODY MASS INDEX: 37.77 KG/M2 | DIASTOLIC BLOOD PRESSURE: 80 MMHG | WEIGHT: 255 LBS | SYSTOLIC BLOOD PRESSURE: 126 MMHG

## 2023-01-09 DIAGNOSIS — M25.872 SESAMOIDITIS OF LEFT FOOT: ICD-10-CM

## 2023-01-09 DIAGNOSIS — M77.52 CAPSULITIS OF METATARSOPHALANGEAL (MTP) JOINT OF LEFT FOOT: Primary | ICD-10-CM

## 2023-01-09 DIAGNOSIS — M79.672 LEFT FOOT PAIN: ICD-10-CM

## 2023-01-09 PROCEDURE — 99203 OFFICE O/P NEW LOW 30 MIN: CPT | Performed by: PODIATRIST

## 2023-01-09 NOTE — PATIENT INSTRUCTIONS
Thank you for choosing [facility] Podiatry / Foot & Ankle Surgery!    DR. MEDEIROS'S CLINIC LOCATIONS:     [facility] (Friday) TRIAGE LINE: [phone] [address]  APPOINTMENTS: [phone]   [place] [address] RADIOLOGY: [phone]    PHYSICAL THERAPY: [phone]    SET UP SURGERY: [phone]   [facility] (Mon-Tues AM-Thurs) BILLING QUESTIONS: [phone]   [address] FAX: [phone]   [place] [address]      Follow up: 3 weeks via MyChart if further therapies are needed      SESAMOID INJURIES IN THE FOOT  A sesamoid is a bone embedded in a tendon. Sesamoids are found in several joints in the body. In the normal foot, the sesamoids are two pea-shaped bones located in the ball of the foot, beneath the big toe joint.  Acting as a pulley for tendons, the sesamoids help the big toe move normally and provide leverage when the big toe  pushes off  during walking and running. The sesamoids also serve as a weight-bearing surface for the first metatarsal bone (the long bone connected to the big toe), absorbing the weight placed on the ball of the foot when walking, running, and jumping.  Sesamoid injuries can involve the bones, tendons, and/or surrounding tissue in the joint. They are often associated with activities requiring increased pressure on the ball of the foot, such as running, basketball, football, golf, tennis, and ballet. In addition, people with high arches are at risk for developing sesamoid problems. Frequent wearing of high-heeled shoes can also be a contributing factor.  TYPES OF INJURIES  There are three types of sesamoid injuries in the foot:  Turf Toe: This is an injury of the soft tissue surrounding the big toe joint. It usually occurs when the big toe joint is extended beyond its normal range. Turf toe causes immediate, sharp pain and swelling. It usually affects the entire big toe joint and limits the motion of the toe. Turf toe may result in an  injury to the soft tissue attached to the sesamoid or a fracture of the sesamoid. Sometimes a  pop  is felt at the moment of injury.   Fracture: A fracture (break) in a sesamoid bone can be either acute or chronic.   An acute fracture is caused by trauma - a direct blow or impact to the bone. An acute sesamoid fracture produces immediate pain and swelling at the site of the break, but usually does not affect the entire big toe joint.   A chronic fracture is a stress fracture (a hairline break usually caused by repetitive stress or overuse). A chronic sesamoid fracture produces longstanding pain in the ball of the foot beneath the big toe joint. The pain, which tends to come and go, generally is aggravated with activity and relieved with rest.  Sesamoiditis: This is an overuse injury involving chronic inflammation of the sesamoid bones and the tendons involved with those bones. Sesamoiditis is caused by increased pressure to the sesamoids. Often, sesamoiditis is associated with a dull, longstanding pain beneath the big toe joint. The pain comes and goes, usually occurring with certain shoes or certain activities.  DIAGNOSIS  In diagnosing a sesamoid injury, the foot and ankle surgeon will examine the foot, focusing on the big toe joint. The surgeon will press on the big toe, move it up and down, and may assess the patient s walking and evaluate the wear pattern on the patient s shoes. X-rays are ordered, and in some cases, advanced imaging studies may be ordered.  NON-SURGICAL TREATMENT  Non-surgical treatment for sesamoid injuries of the foot may include one or more of the following options, depending on the type of injury and degree of severity:  Padding, strapping, or taping. A pad may be placed in the shoe to cushion the inflamed sesamoid area, or the toe may be taped or strapped to relieve that area of tension.   Immobilization. The foot may be placed in a cast or removable walking cast. Crutches may be used to  prevent placing weight on the foot.   Oral medications. Nonsteroidal anti-inflammatory drugs (NSAIDs), such as ibuprofen, are often helpful in reducing the pain and inflammation.   Physical therapy. The rehabilitation period following immobilization sometimes includes physical therapy, such as exercises (range-of-motion, strengthening, and conditioning) and ultrasound therapy.   Steroid injections. In some cases, cortisone is injected in the joint to reduce pain and inflammation.   Orthotic devices. Custom orthotic devices that fit into the shoe may be prescribed for long-term treatment of sesamoiditis to balance the pressure placed on the ball of the foot.  SURGICAL TREATMENT  When sesamoid injuries fail to respond to non-surgical treatment, surgery may be required. The foot and ankle surgeon will determine the type of procedure that is best suited to the individual patient.    CAPSULITIS / METATARSALGIA  All joints in the body are surrounded by a capsule, or a covering of soft tissue and ligaments. The capsule holds bones together and secretes joint fluid to help lubricate the joint. If a joint capsule is exposed to excessive force, it can develop microscopic tears and become inflamed. This commonly occurs in the foot due to mild variation in anatomy. Hammertoes, bunions, irregular bone length, joint immobility, etc. can all lead to excessive force on the joint. Capsule injury can also occur due to repetitive stress from exercise, insufficient support from shoes, excessive bare foot walking and excessive weight.      Conservative treatments include ice, rest from the aggravating activity, weight loss, orthotic inserts, improving shoes and shoe modifications. You can purchase an over the counter felt metatarsal pad to place in your shoes at [facility] or on [employer]. Appropriate shoes will protect the inflamed tissue improving the chances of healing. Avoidance of standing or walking barefoot, including around the house,  is necessary to allow healing. Casts are sometimes used for more aggressive protection.  NSAIDs such as Advil are also used to help with pain and decreasing inflammation. If pain continues over a period of weeks with continuous rest and icing, Corticosteroid injections can be a treatment option to try and help decrease inflammation.    Surgery is often necessary to correct the underlying structural problem. Surgery might include shortening an excessively long bone, repairing bunion or hammertoe, lengthening a tight Achilles  tendon, etc. These are same day surgeries that might be pursued if more conservative measures fail to provide relief.      The inflamed joint capsule has the potential to completely tear. This will allow the toe to drift off the ground, curving toward the other toes. The involved toe may under or overlap the adjacent toes as drift continues. The pain may improve after the joint tears or this new position will be permanent. Surgery can address the toe alignment. Your goal of treating capsulitis is to avoid this scenario.        ** You can find felt metatarsal pads to place in your shoes at our [facility], [employer], [employer], or on [employer] **      Over the Counter Inserts      Super Feet are the most common and easiest to find.  Locations include any [employer] [facility]oes Store, [employer] in [place] on [address] and in [place] on [address], [employer] in [place] on [address], [facility] Room in [place] on [address], [facility] Running Room in [place] on [address], [employer] in [place] on [address] and [place] [employer] in [place] on [address] and in [place] on [address].    Spenco can be found online and at [employer] in [place] on [address], [employer] in [place] on [address], [employer] Store in [place] on [address], [employer] in [place] on [address] and [employer]  "in [place] on [address].    Power Step can be a little harder to find.  Locations include [facility] on [address] in [place], [employer] in [place] on [address], [place] in [employer], [employer] in [place] on [address] and online    **  A good high quality over the counter insert can cost around $30-$40.        PRICE Therapy    Many aches and pains throughout the foot and ankle can be helped with many simple treatments.  This is usually described as PRICE Therapy.      P - Protection - often times, inflammation/pain in the lower extremity is not able to improve simply because the areas involved are never allowed to rest.  Every step we take can bother the problematic area.  Protecting those areas is an important step in the healing process.  This may involve a walking cast boot, a special insert/orthotic device, an ankle brace, or simply avoiding barefoot walking.    R - Rest - in addition to protecting the foot/ankle, resting is an important, but often times difficult, treatment option.  Getting off your feet when they bother you, and specifically avoiding activities that cause pain/discomfort, are very beneficial to prevent, and treat, foot/ankle pain.  \"If there's something that makes it hurt(eg activities, shoe gear), and you keep doing the thing that makes it hurt, it's just going to keep hurting\".      I - Ice - icing regularly can help to decrease inflammation and swelling in the foot, thus decreasing pain.  Using an ice pack or a bag of frozen peas works very well.  Ice for 20 minutes multiple times per day as needed.  Do not place the ice directly on the skin as this can cause tissue damage.    C - Compression - using a compression wrap or an ACE wrap can help to decrease swelling, which can help to decrease pain.  Wearing the wraps is generally not needed at night, but they should be worn on a regular basis when you are going to be on your feet for prolonged periods as gravity tends to " pull fluids down to your feet/ankles.    E - Elevation - elevating your lower extremities multiple times daily for 15-20 minutes can help to decrease swelling, which works well in decreasing pain levels.      NSAID/Tylenol - An anti-inflammatory, like Aleve or ibuprofen, and/or a pain medication, such as Tylenol, can help to improve pain levels and get the issue resolved sooner rather than later.  Also, topical anti-inflammatory medications like Voltaren gel can be used for local treatment, with the benefit of avoiding system issues with oral medications.  Anyone with liver issues should be careful with Tylenol, and anyone with high blood pressure or heart, stomach or kidney issues should be careful with anti-inflammatories.  Please ask if you have questions about these medications, including dosage.

## 2023-01-09 NOTE — PROGRESS NOTES
"Foot & Ankle Surgery  January 9, 2023    CC: \"Osteoarthrosis\"    I was asked to see Pavel Greenwood regarding the chief complaint by: Dr. Janet Malagon    HPI:  Pt is a 62 year old male who presents with above complaint.  2-week history of left foot pain.  No injury noted.  He describes aching pain.  6 out of 10 \"I can always feel it but it only really hurts when I walk\".  \"Walking and standing.  I am a  so I am on my feet for long hours and take 12,000 steps on an average workday\" for exacerbating factors.  Just recently he has started taking aspirin and doing some home therapies and states that his symptoms have been responding.  He does have OTC inserts    ROS:   Pos for CC.  The patient denies current nausea, vomiting, chills, fevers, belly pain, calf pain, chest pain or SOB.  Complete remainder of ROS is otherwise neg.    VITALS:    Vitals:    01/09/23 1400   BP: 126/80   Weight: 115.7 kg (255 lb)   Height: 1.753 m (5' 9\")       PMH:    Past Medical History:   Diagnosis Date     Colon polyps      Gout      Otitis media     recurrent, s/p multiple PE tubes as a child       SXHX:    Past Surgical History:   Procedure Laterality Date     COLONOSCOPY       MYRINGOTOMY BILATERAL       TONSILLECTOMY          MEDS:    Current Outpatient Medications   Medication     allopurinol (ZYLOPRIM) 100 MG tablet     allopurinol (ZYLOPRIM) 300 MG tablet     No current facility-administered medications for this visit.       ALL:   No Known Allergies    FMH:    Family History   Problem Relation Age of Onset     Anxiety Disorder Paternal Grandmother      Cerebrovascular Disease Brother         small stroke     Other Cancer Maternal Grandfather         lung CA r/t smoking       SocHx:    Social History     Socioeconomic History     Marital status: Single     Spouse name: Not on file     Number of children: Not on file     Years of education: Not on file     Highest education level: 12th grade   Occupational History "     Not on file   Tobacco Use     Smoking status: Former     Types: Cigarettes     Quit date: 2008     Years since quittin.6     Smokeless tobacco: Never   Vaping Use     Vaping Use: Not on file   Substance and Sexual Activity     Alcohol use: Yes     Alcohol/week: 8.0 standard drinks     Types: 8 Standard drinks or equivalent per week     Comment: A glass of wine every night.  Maybe 9-10 drinks a week.     Drug use: No     Sexual activity: Not Currently     Partners: Female     Birth control/protection: Female Surgical   Other Topics Concern     Parent/sibling w/ CABG, MI or angioplasty before 65F 55M? No   Social History Narrative     Not on file     Social Determinants of Health     Financial Resource Strain: Low Risk      Difficulty of Paying Living Expenses: Not hard at all   Food Insecurity: No Food Insecurity     Worried About Running Out of Food in the Last Year: Never true     Ran Out of Food in the Last Year: Never true   Transportation Needs: No Transportation Needs     Lack of Transportation (Medical): No     Lack of Transportation (Non-Medical): No   Physical Activity: Inactive     Days of Exercise per Week: 0 days     Minutes of Exercise per Session: 0 min   Stress: No Stress Concern Present     Feeling of Stress : Only a little   Social Connections: Socially Isolated     Frequency of Communication with Friends and Family: Twice a week     Frequency of Social Gatherings with Friends and Family: Once a week     Attends Taoist Services: Never     Active Member of Clubs or Organizations: No     Attends Club or Organization Meetings: Not on file     Marital Status: Never    Intimate Partner Violence: Not on file   Housing Stability: Low Risk      Unable to Pay for Housing in the Last Year: No     Number of Places Lived in the Last Year: 1     Unstable Housing in the Last Year: No           EXAMINATION:  Gen:   No apparent distress  Neuro:   A&Ox3, no deficits  Psych:    Answering  questions appropriately for age and situation with normal affect  Head:    NCAT  Eye:    Visual scanning without deficit  Ear:    Response to auditory stimuli wnl  Lung:    Non-labored breathing on RA noted  Abd:    NTND per patient report  Lymph:    Neg for pitting/non-pitting edema BLE  Vasc:    Pulses palpable, CFT minimally delayed  Neuro:    Light touch sensation intact to all sensory nerve distributions without paresthesias  Derm:    Neg for nodules, lesions or ulcerations  MSK:    Left foot -mild cavus foot structure.  There is tenderness on palpation of the fibular sesamoid as well as mild discomfort at the second MPJ plantarly.  Lockman maneuver at the second MPJ is negative/negative.  Passive range of motion of the first MPJ shows no pain or crepitus.  Calf:    Neg for redness, swelling or tenderness      Imaging: 3 views left foot 1/4/2023 - IMPRESSION: Mild osteoarthrosis of the first MTP joint. This is new.  Pes cavus and tiny calcaneal enthesophytes again noted. Otherwise  negative. There is no evidence of fracture.      Assessment:  62 year old male with left foot pain including the fibular sesamoiditis and second MPJ capsulitis with plantar plate pain      Plan:  Discussed etiologies, anatomy and options  1.  Left foot pain including fibular sesamoiditis and second MPJ capsulitis with plantar plate pain  -I personally reviewed and interpreted the patient's lower extremity history pertinent to today's visit, including imaging/labs, in preparation for initiating a treatment program.  -I personally reviewed and interpreted the 1/4/2023 x-rays  -Our sesamoiditis and capsulitis handouts were dispensed for patient information  -Comfortable shoes and minimize shoeless walking based on symptoms  -He currently has OTC inserts that are providing relief.  Our OTC insert handout was dispensed for completeness sake  -RICE/NSAID versus Tylenol as needed based on pain  -He will follow-up via Psychiatrict in a few weeks  if home therapies are insufficient and we will consider Mobic versus Voltaren gel.  Consider custom orthotics as well  -The first MPJ arthritis noted on x-rays is clinically minimally symptomatic.  As such, I advise he simply monitor for now    Follow up:  prn or sooner with acute issues      Patient's medical history was reviewed today      Todd Mueller DPM [facility] FACFA  Podiatric Foot & Ankle Surgeon  [facility]  [phone]    Disclaimer: This note consists of symbols derived from keyboarding, dictation and/or voice recognition software. As a result, there may be errors in the script that have gone undetected. Please consider this when interpreting information found in this chart.

## 2023-01-09 NOTE — LETTER
"    1/9/2023         RE: Pavel Greenwood  [address]  [address]  [facility] [address]        Dear Colleague,    Thank you for referring your patient, Pavel Greenwood, to the [facility] PODIATRY. Please see a copy of my visit note below.    Foot & Ankle Surgery  January 9, 2023    CC: \"Osteoarthrosis\"    I was asked to see Pavel Greenwood regarding the chief complaint by: Dr. Janet Malagon    HPI:  Pt is a 62 year old male who presents with above complaint.  2-week history of left foot pain.  No injury noted.  He describes aching pain.  6 out of 10 \"I can always feel it but it only really hurts when I walk\".  \"Walking and standing.  I am a  so I am on my feet for long hours and take 12,000 steps on an average workday\" for exacerbating factors.  Just recently he has started taking aspirin and doing some home therapies and states that his symptoms have been responding.  He does have OTC inserts    ROS:   Pos for CC.  The patient denies current nausea, vomiting, chills, fevers, belly pain, calf pain, chest pain or SOB.  Complete remainder of ROS is otherwise neg.    VITALS:    Vitals:    01/09/23 1400   BP: 126/80   Weight: 115.7 kg (255 lb)   Height: 1.753 m (5' 9\")       PMH:    Past Medical History:   Diagnosis Date     Colon polyps      Gout      Otitis media     recurrent, s/p multiple PE tubes as a child       SXHX:    Past Surgical History:   Procedure Laterality Date     COLONOSCOPY       MYRINGOTOMY BILATERAL       TONSILLECTOMY          MEDS:    Current Outpatient Medications   Medication     allopurinol (ZYLOPRIM) 100 MG tablet     allopurinol (ZYLOPRIM) 300 MG tablet     No current facility-administered medications for this visit.       ALL:   No Known Allergies    FMH:    Family History   Problem Relation Age of Onset     Anxiety Disorder Paternal Grandmother      Cerebrovascular Disease Brother         small stroke     Other Cancer Maternal Grandfather         lung CA " r/t smoking       SocHx:    Social History     Socioeconomic History     Marital status: Single     Spouse name: Not on file     Number of children: Not on file     Years of education: Not on file     Highest education level: 12th grade   Occupational History     Not on file   Tobacco Use     Smoking status: Former     Types: Cigarettes     Quit date: 2008     Years since quittin.6     Smokeless tobacco: Never   Vaping Use     Vaping Use: Not on file   Substance and Sexual Activity     Alcohol use: Yes     Alcohol/week: 8.0 standard drinks     Types: 8 Standard drinks or equivalent per week     Comment: A glass of wine every night.  Maybe 9-10 drinks a week.     Drug use: No     Sexual activity: Not Currently     Partners: Female     Birth control/protection: Female Surgical   Other Topics Concern     Parent/sibling w/ CABG, MI or angioplasty before 65F 55M? No   Social History Narrative     Not on file     Social Determinants of Health     Financial Resource Strain: Low Risk      Difficulty of Paying Living Expenses: Not hard at all   Food Insecurity: No Food Insecurity     Worried About Running Out of Food in the Last Year: Never true     Ran Out of Food in the Last Year: Never true   Transportation Needs: No Transportation Needs     Lack of Transportation (Medical): No     Lack of Transportation (Non-Medical): No   Physical Activity: Inactive     Days of Exercise per Week: 0 days     Minutes of Exercise per Session: 0 min   Stress: No Stress Concern Present     Feeling of Stress : Only a little   Social Connections: Socially Isolated     Frequency of Communication with Friends and Family: Twice a week     Frequency of Social Gatherings with Friends and Family: Once a week     Attends Tenriism Services: Never     Active Member of Clubs or Organizations: No     Attends Club or Organization Meetings: Not on file     Marital Status: Never    Intimate Partner Violence: Not on file   Housing  Stability: Low Risk      Unable to Pay for Housing in the Last Year: No     Number of Places Lived in the Last Year: 1     Unstable Housing in the Last Year: No           EXAMINATION:  Gen:   No apparent distress  Neuro:   A&Ox3, no deficits  Psych:    Answering questions appropriately for age and situation with normal affect  Head:    NCAT  Eye:    Visual scanning without deficit  Ear:    Response to auditory stimuli wnl  Lung:    Non-labored breathing on RA noted  Abd:    NTND per patient report  Lymph:    Neg for pitting/non-pitting edema BLE  Vasc:    Pulses palpable, CFT minimally delayed  Neuro:    Light touch sensation intact to all sensory nerve distributions without paresthesias  Derm:    Neg for nodules, lesions or ulcerations  MSK:    Left foot -mild cavus foot structure.  There is tenderness on palpation of the fibular sesamoid as well as mild discomfort at the second MPJ plantarly.  Lockman maneuver at the second MPJ is negative/negative.  Passive range of motion of the first MPJ shows no pain or crepitus.  Calf:    Neg for redness, swelling or tenderness      Imaging: 3 views left foot 1/4/2023 - IMPRESSION: Mild osteoarthrosis of the first MTP joint. This is new.  Pes cavus and tiny calcaneal enthesophytes again noted. Otherwise  negative. There is no evidence of fracture.      Assessment:  62 year old male with left foot pain including the fibular sesamoiditis and second MPJ capsulitis with plantar plate pain      Plan:  Discussed etiologies, anatomy and options  1.  Left foot pain including fibular sesamoiditis and second MPJ capsulitis with plantar plate pain  -I personally reviewed and interpreted the patient's lower extremity history pertinent to today's visit, including imaging/labs, in preparation for initiating a treatment program.  -I personally reviewed and interpreted the 1/4/2023 x-rays  -Our sesamoiditis and capsulitis handouts were dispensed for patient information  -Comfortable shoes  and minimize shoeless walking based on symptoms  -He currently has OTC inserts that are providing relief.  Our OTC insert handout was dispensed for completeness sake  -RICE/NSAID versus Tylenol as needed based on pain  -He will follow-up via [facility] in a few weeks if home therapies are insufficient and we will consider Mobic versus Voltaren gel.  Consider custom orthotics as well  -The first MPJ arthritis noted on x-rays is clinically minimally symptomatic.  As such, I advise he simply monitor for now    Follow up:  prn or sooner with acute issues      Patient's medical history was reviewed today      Todd Mueller DPM FACFAS FACFAOM  Podiatric Foot & Ankle Surgeon  [facility]  [phone]    Disclaimer: This note consists of symbols derived from keyboarding, dictation and/or voice recognition software. As a result, there may be errors in the script that have gone undetected. Please consider this when interpreting information found in this chart.            Again, thank you for allowing me to participate in the care of your patient.        Sincerely,        Todd Mueller DPM, BEHZAD

## 2023-03-21 ENCOUNTER — ANCILLARY PROCEDURE (OUTPATIENT)
Dept: GENERAL RADIOLOGY | Facility: CLINIC | Age: 63
End: 2023-03-21
Attending: PODIATRIST
Payer: COMMERCIAL

## 2023-03-21 ENCOUNTER — OFFICE VISIT (OUTPATIENT)
Dept: PODIATRY | Facility: CLINIC | Age: 63
End: 2023-03-21
Payer: COMMERCIAL

## 2023-03-21 VITALS — BODY MASS INDEX: 37.66 KG/M2 | DIASTOLIC BLOOD PRESSURE: 90 MMHG | WEIGHT: 255 LBS | SYSTOLIC BLOOD PRESSURE: 144 MMHG

## 2023-03-21 DIAGNOSIS — M77.42 METATARSALGIA, LEFT FOOT: Primary | ICD-10-CM

## 2023-03-21 DIAGNOSIS — M77.42 METATARSALGIA, LEFT FOOT: ICD-10-CM

## 2023-03-21 PROCEDURE — 73630 X-RAY EXAM OF FOOT: CPT | Mod: TC | Performed by: RADIOLOGY

## 2023-03-21 PROCEDURE — 99213 OFFICE O/P EST LOW 20 MIN: CPT | Performed by: PODIATRIST

## 2023-03-21 NOTE — PROGRESS NOTES
"Foot & Ankle Surgery   March 21, 2023    S:  Pt is seen today for evaluation of left foot.  I saw him on 1/9/2023 for left foot pain, consistent with second MPJ capsulitis and fibular sesamoid pain.  We discussed baseline therapies, and via a [employer] message on 2/14/2023, he was given a prescription for Mobic and Voltaren gel.  He comes in today that stating overall pain levels are much improved, but \"it looks worse\", and he has concerns about swelling on the top of the foot..    Vitals:    03/21/23 1001   BP: (!) 144/90   Weight: 115.7 kg (255 lb)   '      ROS - Pos for CC.  Patient denies current nausea, vomiting, chills, fevers, belly pain, calf pain, chest pain or SOB.  Complete remainder of ROS it otherwise neg.      PE:  Gen:   No apparent distress  Eye:    Visual scanning without deficit  Ear:    Response to auditory stimuli wnl  Lung:    Non-labored breathing on RA noted  Abd:    NTND per patient report  Lymph: Swelling dorsal left forefoot including the second toe  Vasc:    Pulses palpable, CFT minimally delayed  Neuro:    Light touch sensation intact to all sensory nerve distributions without paresthesias  Derm:    Neg for nodules, lesions or ulcerations  MSK:    Left foot -previous areas of pain including the plantar second MPJ and the fibular sesamoid show no pain today.  Rather, he has mild discomfort and localized inflammation along the distal second metatarsal  Calf:    Neg for redness, swelling or tenderness      Imaging: 3 views weightbearing left foot -no fractures, and no periosteal reactions are seen along the second metatarsal      Assessment:  62 year old male with left second metatarsal stress reaction       Medical Decision Making/Plan:  Discussed etiologies, anatomy and options  1.  Left second metatarsal stress reaction   -I personally reviewed and interpreted the x-ray results today.  No acute pathology is seen  -Comfortable shoes, minimize shoeless walking  -RICE/NSAID versus Tylenol as " "needed based on pain.  Advised the patient to \"baby the foot\" is much as symptoms dictate  -He is a  and is on his feet 50 hours a week, but he does have a 2-week vacation coming up.  His plan is to rest relax and read.  -He will schedule a 4-week follow-up appointment.  If in 4 weeks, symptoms have responded appropriately, no further follow-up or intervention are necessary.  If in 4 weeks, symptoms persist, we will repeat x-rays and reassess for stress reaction/fracture    Follow up: 4 weeks or sooner with acute issues           Todd Mueller DPM FACFAS FACFAOM  Podiatric Foot & Ankle Surgeon  [facility]  [phone]    Disclaimer: This note consists of symbols derived from keyboarding, dictation and/or voice recognition software. As a result, there may be errors in the script that have gone undetected. Please consider this when interpreting information found in this chart.      "

## 2023-03-21 NOTE — Clinical Note
"    3/21/2023         RE: Pavel Greenwood  [address]  [address]  [facility] [address]        Dear Colleague,    Thank you for referring your patient, Pavel Greenwood, to the [facility] PODIATRY. Please see a copy of my visit note below.    Foot & Ankle Surgery   March 21, 2023    S:  Pt is seen today for evaluation of left foot.  I saw him on 1/9/2023 for left foot pain, consistent with second MPJ capsulitis and fibular sesamoid pain.  We discussed baseline therapies, and via a [employer] message on 2/14/2023, he was given a prescription for Mobic and Voltaren gel.  He comes in today that stating overall pain levels are much improved, but \"it looks worse\", and he has concerns about swelling on the top of the foot..    Vitals:    03/21/23 1001   BP: (!) 144/90   Weight: 115.7 kg (255 lb)   '      ROS - Pos for CC.  Patient denies current nausea, vomiting, chills, fevers, belly pain, calf pain, chest pain or SOB.  Complete remainder of ROS it otherwise neg.      PE:  Gen:   No apparent distress  Eye:    Visual scanning without deficit  Ear:    Response to auditory stimuli wnl  Lung:    Non-labored breathing on RA noted  Abd:    NTND per patient report  Lymph: Swelling dorsal left forefoot including the second toe  Vasc:    Pulses palpable, CFT minimally delayed  Neuro:    Light touch sensation intact to all sensory nerve distributions without paresthesias  Derm:    Neg for nodules, lesions or ulcerations  MSK:    Left foot -previous areas of pain including the plantar second MPJ and the fibular sesamoid show no pain today.  Rather, he has mild discomfort and localized inflammation along the distal second metatarsal  Calf:    Neg for redness, swelling or tenderness      Imaging: 3 views weightbearing left foot -no fractures, and no periosteal reactions are seen along the second metatarsal      Assessment:  62 year old male with left second metatarsal stress reaction       Medical Decision Making/Plan: " " Discussed etiologies, anatomy and options  1.  Left second metatarsal stress reaction   -I personally reviewed and interpreted the x-ray results today.  No acute pathology is seen  -Comfortable shoes, minimize shoeless walking  -RICE/NSAID versus Tylenol as needed based on pain.  Advised the patient to \"baby the foot\" is much as symptoms dictate  -He is a  and is on his feet 50 hours a week, but he does have a 2-week vacation coming up.  His plan is to rest relax and read.  -He will schedule a 4-week follow-up appointment.  If in 4 weeks, symptoms have responded appropriately, no further follow-up or intervention are necessary.  If in 4 weeks, symptoms persist, we will repeat x-rays and reassess for stress reaction/fracture    Follow up: 4 weeks or sooner with acute issues           Todd Mueller DPM [facility] FACFAOM  Podiatric Foot & Ankle Surgeon  [facility]  [phone]    Disclaimer: This note consists of symbols derived from keyboarding, dictation and/or voice recognition software. As a result, there may be errors in the script that have gone undetected. Please consider this when interpreting information found in this chart.          Again, thank you for allowing me to participate in the care of your patient.        Sincerely,        Todd Mueller DPM, BEHZAD  "

## 2023-04-16 DIAGNOSIS — M10.079 IDIOPATHIC GOUT INVOLVING TOE, UNSPECIFIED CHRONICITY, UNSPECIFIED LATERALITY: ICD-10-CM

## 2023-04-16 NOTE — LETTER
[facility] [place]  [address]  [address]  [place] [address]  Phone: [phone]  Fax: [phone]        May 2, 2023      Pavel Greenwood                                                                                                                                [address]    [place] [address]            Dear Rolando Greenwood,    We have attempted to reach you are concerned about your health care.  We recently provided you with a medication refill.  Many medications require routine follow-up with your Doctor.      At this time we ask that: {APPT REQ:957990}    Your prescription: {PRESCRIPTION:752713}      Thank you,      {:[id number]}/ ***

## 2023-04-16 NOTE — LETTER
[facility]  [address]  [address]  [place] [address]  Phone: [phone]  Fax: [phone]        May 2, 2023      Pavel Greenwood                                                                                                                                [address]    [place] [address]            Dear Mr. Greenwood,    We have attempted to reach you multiple times because we are concerned about your health care.  We recently provided you with a medication refill.  Many medications require routine follow-up with your Doctor.      At this time we ask that: You schedule an appointment for your annual physical.    Your prescription: Has been refilled for 1 time only so you may have time for the above noted follow-up.    Please call us at [phone] to schedule an appointment or you can schedule online through [facility] if you prefer.     If you have any questions, you can call the number mentioned above.     Thank you,      [facility] Care Team

## 2023-04-18 RX ORDER — ALLOPURINOL 100 MG/1
TABLET ORAL
Qty: 180 TABLET | Refills: 1 | Status: SHIPPED | OUTPATIENT
Start: 2023-04-18 | End: 2023-07-21

## 2023-04-18 RX ORDER — ALLOPURINOL 300 MG/1
TABLET ORAL
Qty: 90 TABLET | Refills: 1 | Status: SHIPPED | OUTPATIENT
Start: 2023-04-18 | End: 2023-07-21

## 2023-04-18 NOTE — TELEPHONE ENCOUNTER
Routing refill request to provider for review/approval because:  Labs not current:  CBC, ALT, Uric Acid, Creatinine    Kristine ROSENBAUM RN, BSN

## 2023-05-04 ENCOUNTER — OFFICE VISIT (OUTPATIENT)
Dept: PODIATRY | Facility: CLINIC | Age: 63
End: 2023-05-04
Payer: COMMERCIAL

## 2023-05-04 ENCOUNTER — ANCILLARY PROCEDURE (OUTPATIENT)
Dept: GENERAL RADIOLOGY | Facility: CLINIC | Age: 63
End: 2023-05-04
Attending: PODIATRIST
Payer: COMMERCIAL

## 2023-05-04 VITALS
HEIGHT: 69 IN | WEIGHT: 255 LBS | BODY MASS INDEX: 37.77 KG/M2 | SYSTOLIC BLOOD PRESSURE: 138 MMHG | DIASTOLIC BLOOD PRESSURE: 65 MMHG | HEART RATE: 84 BPM

## 2023-05-04 DIAGNOSIS — M77.42 METATARSALGIA, LEFT FOOT: Primary | ICD-10-CM

## 2023-05-04 DIAGNOSIS — M77.42 METATARSALGIA, LEFT FOOT: ICD-10-CM

## 2023-05-04 PROCEDURE — 73630 X-RAY EXAM OF FOOT: CPT | Mod: TC | Performed by: RADIOLOGY

## 2023-05-04 PROCEDURE — 99213 OFFICE O/P EST LOW 20 MIN: CPT | Performed by: PODIATRIST

## 2023-05-04 ASSESSMENT — PAIN SCALES - GENERAL: PAINLEVEL: MODERATE PAIN (4)

## 2023-05-04 NOTE — PROGRESS NOTES
"Foot & Ankle Surgery   May 4, 2023    S:  Pt is seen today for evaluation of left foot pain.  In the past, he had pain along the second MPJ and fibular sesamoid.  Last visit on 3/21/2023, these were feeling better but she was having increased pain along the second metatarsal.  Today he indicates that the foot is not doing much better despite Mobic and Voltaren.  X-rays were obtained today.  He indicates that the pain along the second metatarsal is improved.  However, he states the pain at the \"ball of the foot\" is the main issue today.    Vitals:    05/04/23 1250   BP: 138/65   Pulse: 84   Weight: 115.7 kg (255 lb)   Height: 1.753 m (5' 9\")   '      ROS - Pos for CC.  Patient denies current nausea, vomiting, chills, fevers, belly pain, calf pain, chest pain or SOB.  Complete remainder of ROS it otherwise neg.      PE:  Gen:   No apparent distress  Eye:    Visual scanning without deficit  Ear:    Response to auditory stimuli wnl  Lung:    Non-labored breathing on RA noted  Abd:    NTND per patient report  Lymph:    Dorsal foot swelling, non-specific  Vasc:    Pulses palpable, CFT minimally delayed  Neuro:    Light touch sensation intact to all sensory nerve distributions without paresthesias  Derm:    Neg for nodules, lesions or ulcerations  MSK:    Mild cavus foot structure.   Minimal pain along the second metatarsal or the fibular sesamoid.  He does have tenderness on the plantar second MPJ.  Lachman maneuver is negative for pain or instability  Calf:    Neg for redness, swelling or tenderness      Imaging: 3 views weightbearing -no evidence of second metatarsal stress fracture.  Mild osteopenic changes noted to the second proximal phalanx base compared to the 3/21/2023 x-rays but no acute fractures are identified    Assessment:  63 year old male with continued second ray pain including second MPJ/plantar plate and second metatarsal in setting of cavus foot structure      Medical Decision Making/Plan:  Discussed " etiologies, anatomy and options  1.  Continued second ray pain including second MPJ/plantar plate and second metatarsal in setting of cavus foot structure  -I personally reviewed and interpreted today's x-ray results  -Weightbearing as tolerated in walking cast boot, instructions discussed   -RICE/NSAID versus Tylenol as needed based on pain  -Orthotic lab referral.  The patient is also considering an online service for custom orthotics.  -Help to alleviate the stress along the second ray, we will transition to shoes and orthotics as our long-term  -Consider PT, advanced imaging    Regarding the CAM walker, the following proper-usage instructions were discussed and dispensed:  1.  Do not sleep with the CAM boot on; 2.  Do not wear during long-distance travel, ie long car rides, plane trips(they were instructed not to drive with it if the involved foot is required to operate a vehicle); 3.  The patient was encouraged to remove the boot throughout the day when off their feet;  4.  They are to have the boot on when ambulating, regardless of WB status    Follow up: 3 weeks or sooner with acute issues           Todd Mueller DPM FACFAS FACFAOM  Podiatric Foot & Ankle Surgeon  [facility]  [phone]    Disclaimer: This note consists of symbols derived from keyboarding, dictation and/or voice recognition software. As a result, there may be errors in the script that have gone undetected. Please consider this when interpreting information found in this chart.

## 2023-05-04 NOTE — NURSING NOTE
"Chief Complaint   Patient presents with     RECHECK     Left foot- no changes       Initial /65   Pulse 84   Ht 1.753 m (5' 9\")   Wt 115.7 kg (255 lb)   BMI 37.66 kg/m   Estimated body mass index is 37.66 kg/m  as calculated from the following:    Height as of this encounter: 1.753 m (5' 9\").    Weight as of this encounter: 115.7 kg (255 lb).  Medications and allergies reviewed.      Suly WHITE MA    "

## 2023-05-04 NOTE — PATIENT INSTRUCTIONS
Thank you for choosing [facility] Podiatry / Foot & Ankle Surgery!    DR. MEDEIROS'S CLINIC LOCATIONS:     [facility] (Friday) TRIAGE LINE: [phone]   [address]  APPOINTMENTS: [phone]   [place] [address] RADIOLOGY: [phone]    PHYSICAL THERAPY: [phone]    SET UP SURGERY: [phone]   [place] (Mon-Tues AM-Thurs) BILLING QUESTIONS: [phone]   [address] FAX: [phone]   [place] [address]      [facility] LOCATIONS  [facility]  [address]  [place] [address]  Phone: [phone]  Fax: [phone] [facility]  [address]  [place] [address]  Phone: [phone]   Fax: [phone]   [facility]  [address]  [place] [address]  Phone: [phone]  Fax: [phone] [facility]  [address]  [place] [address]  Phone: [phone]   Fax: [phone]   [facility]   [address]  [place] [address]  Phone: [phone]  Fax: [phone] * Please call any location listed to make an appointment for a casting/fitting. Your referral was sent to their central office and they will all have the order on file.

## 2023-06-13 ENCOUNTER — PATIENT OUTREACH (OUTPATIENT)
Dept: CARE COORDINATION | Facility: CLINIC | Age: 63
End: 2023-06-13
Payer: COMMERCIAL

## 2023-07-17 DIAGNOSIS — M10.079 IDIOPATHIC GOUT INVOLVING TOE, UNSPECIFIED CHRONICITY, UNSPECIFIED LATERALITY: ICD-10-CM

## 2023-07-21 RX ORDER — ALLOPURINOL 100 MG/1
TABLET ORAL
Qty: 180 TABLET | Refills: 0 | Status: SHIPPED | OUTPATIENT
Start: 2023-07-21 | End: 2024-01-04

## 2023-07-21 RX ORDER — ALLOPURINOL 300 MG/1
TABLET ORAL
Qty: 90 TABLET | Refills: 0 | Status: SHIPPED | OUTPATIENT
Start: 2023-07-21 | End: 2024-01-03

## 2023-09-30 ENCOUNTER — HEALTH MAINTENANCE LETTER (OUTPATIENT)
Age: 63
End: 2023-09-30

## 2023-10-12 ASSESSMENT — ENCOUNTER SYMPTOMS
JOINT SWELLING: 0
COUGH: 0
EYE PAIN: 0
DYSURIA: 0
HEMATURIA: 0
ARTHRALGIAS: 0
CONSTIPATION: 0
CHILLS: 0
SORE THROAT: 0
WEAKNESS: 0
PALPITATIONS: 0
PARESTHESIAS: 0
HEMATOCHEZIA: 0
NAUSEA: 0
SHORTNESS OF BREATH: 0
DIARRHEA: 0
ABDOMINAL PAIN: 0
HEADACHES: 0
NERVOUS/ANXIOUS: 0
MYALGIAS: 0
FREQUENCY: 0
FEVER: 0
DIZZINESS: 0
HEARTBURN: 0

## 2023-10-18 ENCOUNTER — OFFICE VISIT (OUTPATIENT)
Dept: PEDIATRICS | Facility: CLINIC | Age: 63
End: 2023-10-18
Payer: COMMERCIAL

## 2023-10-18 VITALS
HEART RATE: 67 BPM | WEIGHT: 237.9 LBS | SYSTOLIC BLOOD PRESSURE: 114 MMHG | DIASTOLIC BLOOD PRESSURE: 78 MMHG | BODY MASS INDEX: 34.06 KG/M2 | HEIGHT: 70 IN | OXYGEN SATURATION: 98 % | TEMPERATURE: 97.4 F | RESPIRATION RATE: 16 BRPM

## 2023-10-18 DIAGNOSIS — Z00.00 ROUTINE GENERAL MEDICAL EXAMINATION AT A HEALTH CARE FACILITY: Primary | ICD-10-CM

## 2023-10-18 DIAGNOSIS — E66.811 CLASS 1 OBESITY DUE TO EXCESS CALORIES WITHOUT SERIOUS COMORBIDITY WITH BODY MASS INDEX (BMI) OF 34.0 TO 34.9 IN ADULT: ICD-10-CM

## 2023-10-18 DIAGNOSIS — Z13.220 LIPID SCREENING: ICD-10-CM

## 2023-10-18 DIAGNOSIS — L72.9 CYST OF SKIN: ICD-10-CM

## 2023-10-18 DIAGNOSIS — K42.9 UMBILICAL HERNIA WITHOUT OBSTRUCTION AND WITHOUT GANGRENE: ICD-10-CM

## 2023-10-18 DIAGNOSIS — E66.09 CLASS 1 OBESITY DUE TO EXCESS CALORIES WITHOUT SERIOUS COMORBIDITY WITH BODY MASS INDEX (BMI) OF 34.0 TO 34.9 IN ADULT: ICD-10-CM

## 2023-10-18 DIAGNOSIS — Z13.1 SCREENING FOR DIABETES MELLITUS: ICD-10-CM

## 2023-10-18 PROBLEM — L40.50 PSORIATIC ARTHRITIS (H): Status: ACTIVE | Noted: 2023-08-28

## 2023-10-18 LAB — HBA1C MFR BLD: 5.3 % (ref 0–5.6)

## 2023-10-18 PROCEDURE — 80061 LIPID PANEL: CPT | Performed by: PHYSICIAN ASSISTANT

## 2023-10-18 PROCEDURE — 36415 COLL VENOUS BLD VENIPUNCTURE: CPT | Performed by: PHYSICIAN ASSISTANT

## 2023-10-18 PROCEDURE — 99396 PREV VISIT EST AGE 40-64: CPT | Mod: 25 | Performed by: PHYSICIAN ASSISTANT

## 2023-10-18 PROCEDURE — 83036 HEMOGLOBIN GLYCOSYLATED A1C: CPT | Performed by: PHYSICIAN ASSISTANT

## 2023-10-18 PROCEDURE — 90678 RSV VACC PREF BIVALENT IM: CPT | Performed by: PHYSICIAN ASSISTANT

## 2023-10-18 PROCEDURE — 99213 OFFICE O/P EST LOW 20 MIN: CPT | Mod: 25 | Performed by: PHYSICIAN ASSISTANT

## 2023-10-18 PROCEDURE — 90471 IMMUNIZATION ADMIN: CPT | Performed by: PHYSICIAN ASSISTANT

## 2023-10-18 RX ORDER — FOLIC ACID 1 MG/1
1 TABLET ORAL DAILY
COMMUNITY
Start: 2023-08-22 | End: 2024-08-21

## 2023-10-18 ASSESSMENT — ENCOUNTER SYMPTOMS
HEMATURIA: 0
DIZZINESS: 0
SORE THROAT: 0
NAUSEA: 0
EYE PAIN: 0
FREQUENCY: 0
NERVOUS/ANXIOUS: 0
MYALGIAS: 0
HEADACHES: 0
DYSURIA: 0
PARESTHESIAS: 0
JOINT SWELLING: 0
HEMATOCHEZIA: 0
CONSTIPATION: 0
FEVER: 0
DIARRHEA: 0
SHORTNESS OF BREATH: 0
PALPITATIONS: 0
ABDOMINAL PAIN: 0
CHILLS: 0
COUGH: 0
WEAKNESS: 0
ARTHRALGIAS: 0
HEARTBURN: 0

## 2023-10-18 ASSESSMENT — PAIN SCALES - GENERAL: PAINLEVEL: NO PAIN (0)

## 2023-10-18 NOTE — PROGRESS NOTES
SUBJECTIVE:   CC: Ace is an 63 year old who presents for preventative health visit.       10/18/2023     2:51 PM   Additional Questions   Roomed by Emilee   Accompanied by self         10/18/2023     2:51 PM   Patient Reported Additional Medications   Patient reports taking the following new medications folic acid and methotrexate       Healthy Habits:     Getting at least 3 servings of Calcium per day:  NO    Bi-annual eye exam:  Yes    Dental care twice a year:  Yes    Sleep apnea or symptoms of sleep apnea:  None    Diet:  Regular (no restrictions)    Frequency of exercise:  None    Taking medications regularly:  Yes    Medication side effects:  None    Additional concerns today:  Yes                      Social History     Tobacco Use    Smoking status: Former     Packs/day: 1.50     Years: 25.00     Additional pack years: 0.00     Total pack years: 37.50     Types: Cigarettes     Start date: 1/1/1982     Quit date: 5/11/2008     Years since quitting: 15.4    Smokeless tobacco: Never   Substance Use Topics    Alcohol use: Yes     Alcohol/week: 8.0 standard drinks of alcohol     Types: 8 Standard drinks or equivalent per week     Comment: 2 or 3 drinks a week.             10/12/2023    11:04 AM   Alcohol Use   Prescreen: >3 drinks/day or >7 drinks/week? No       Last PSA:   PSA   Date Value Ref Range Status   01/06/2020 0.78 0 - 4 ug/L Final     Comment:     Assay Method:  Chemiluminescence using Siemens Vista analyzer       Reviewed orders with patient. Reviewed health maintenance and updated orders accordingly - Yes  Lab work is in process    Reviewed and updated as needed this visit by clinical staff   Tobacco  Allergies  Meds              Reviewed and updated as needed this visit by Provider   Tobacco   Meds    Surg Hx  Fam Hx           1. Small hernia in umbilical area. Not bothering him more then usual. Would like to get it fixed.  2. Right ear lobe, notes a lump in area. Gets bigger at times. Pops  "it- white material and  blood. Not new. Reoccurs.         Review of Systems   Constitutional:  Negative for chills and fever.   HENT:  Negative for congestion, ear pain, hearing loss and sore throat.    Eyes:  Negative for pain and visual disturbance.   Respiratory:  Negative for cough and shortness of breath.    Cardiovascular:  Negative for chest pain, palpitations and peripheral edema.   Gastrointestinal:  Negative for abdominal pain, constipation, diarrhea, heartburn, hematochezia and nausea.   Genitourinary:  Negative for dysuria, frequency, genital sores, hematuria, impotence, penile discharge and urgency.   Musculoskeletal:  Negative for arthralgias, joint swelling and myalgias.   Skin:  Negative for rash.   Neurological:  Negative for dizziness, weakness, headaches and paresthesias.   Psychiatric/Behavioral:  Negative for mood changes. The patient is not nervous/anxious.          OBJECTIVE:   /78 (BP Location: Right arm, Patient Position: Sitting, Cuff Size: Adult Large)   Pulse 67   Temp 97.4  F (36.3  C) (Temporal)   Resp 16   Ht 1.778 m (5' 10\")   Wt 107.9 kg (237 lb 14.4 oz)   SpO2 98%   BMI 34.14 kg/m      Physical Exam  GENERAL: healthy, alert and no distress  EYES: Eyes grossly normal to inspection, PERRL and conjunctivae and sclerae normal  HENT: normal cephalic/atraumatic, right ear: right lobe, small firm nodule noted. , nose and mouth without ulcers or lesions, oropharynx clear, and oral mucous membranes moist  NECK: no adenopathy, no asymmetry, masses, or scars and thyroid normal to palpation  RESP: lungs clear to auscultation - no rales, rhonchi or wheezes  CV: regular rate and rhythm, normal S1 S2, no S3 or S4, no murmur, click or rub, no peripheral edema and peripheral pulses strong  ABDOMEN: soft, nontender, without hepatosplenomegaly or masses, bowel sounds normal, and hernia umbilical area   MS: no gross musculoskeletal defects noted, no edema  SKIN: psoriasis patch umbilical " "area   NEURO: Normal strength and tone, mentation intact and speech normal  PSYCH: mentation appears normal, affect normal/bright  LYMPH: no cervical, supraclavicular, axillary, or inguinal adenopathy        ASSESSMENT/PLAN:   Ace was seen today for physical.    Diagnoses and all orders for this visit:    Routine general medical examination at a health care facility  - annual exam recommended     Umbilical hernia without obstruction and without gangrene  -     Adult General Surg Referral; Future    Cyst of skin  Comments:  left ear lobe  Do not pick at area. If he decides removal. Can send to general surgeon.    Class 1 obesity due to excess calories without serious comorbidity with body mass index (BMI) of 34.0 to 34.9 in adult     Start 25 grams fiber daily, 64 oz fluids, cut out sugary drinks, increase fruits and vegies to at least 5/day and cardiovascular exercise at least 30 min daily    Screening for diabetes mellitus  -     Hemoglobin A1c; Future  -     Hemoglobin A1c    Lipid screening  -     Lipid panel reflex to direct LDL Non-fasting; Future  -     Lipid panel reflex to direct LDL Non-fasting    Other orders  -     RSV VACCINE (ABRYSVO)  -     PRIMARY CARE FOLLOW-UP SCHEDULING; Future  -     PRIMARY CARE FOLLOW-UP SCHEDULING; Future              COUNSELING:   Reviewed preventive health counseling, as reflected in patient instructions      BMI:   Estimated body mass index is 34.14 kg/m  as calculated from the following:    Height as of this encounter: 1.778 m (5' 10\").    Weight as of this encounter: 107.9 kg (237 lb 14.4 oz).   Weight management plan: Discussed healthy diet and exercise guidelines      He reports that he quit smoking about 15 years ago. His smoking use included cigarettes. He started smoking about 41 years ago. He has a 37.50 pack-year smoking history. He has never used smokeless tobacco.            COLT Guerra [facility]  "

## 2023-10-19 LAB
CHOLEST SERPL-MCNC: 158 MG/DL
HDLC SERPL-MCNC: 42 MG/DL
LDLC SERPL CALC-MCNC: 92 MG/DL
NONHDLC SERPL-MCNC: 116 MG/DL
TRIGL SERPL-MCNC: 120 MG/DL

## 2023-11-13 ENCOUNTER — HOSPITAL ENCOUNTER (OUTPATIENT)
Facility: CLINIC | Age: 63
End: 2023-11-13
Attending: SURGERY | Admitting: SURGERY
Payer: COMMERCIAL

## 2023-11-13 ENCOUNTER — OFFICE VISIT (OUTPATIENT)
Dept: SURGERY | Facility: CLINIC | Age: 63
End: 2023-11-13
Payer: COMMERCIAL

## 2023-11-13 ENCOUNTER — TELEPHONE (OUTPATIENT)
Dept: SURGERY | Facility: CLINIC | Age: 63
End: 2023-11-13

## 2023-11-13 VITALS
BODY MASS INDEX: 33.93 KG/M2 | SYSTOLIC BLOOD PRESSURE: 148 MMHG | RESPIRATION RATE: 16 BRPM | HEIGHT: 70 IN | HEART RATE: 67 BPM | DIASTOLIC BLOOD PRESSURE: 100 MMHG | OXYGEN SATURATION: 97 % | WEIGHT: 237 LBS

## 2023-11-13 DIAGNOSIS — K42.9 UMBILICAL HERNIA WITHOUT OBSTRUCTION AND WITHOUT GANGRENE: Primary | ICD-10-CM

## 2023-11-13 PROCEDURE — 99213 OFFICE O/P EST LOW 20 MIN: CPT | Performed by: SURGERY

## 2023-11-13 NOTE — TELEPHONE ENCOUNTER
Type of surgery: OPEN REPAIR UMBILICAL HERNIA WITH MESH  Location of surgery: [facility]  Date and time of surgery: 2/9/2024 @ 8:15 AM   Surgeon: VASU RENAE MD    Pre-Op Appt Date: PATIENT TO SCHEDULE    Post-Op Appt Date: PATIENT TO SCHEDULE     Packet sent out: Yes  Pre-cert/Authorization completed:  Not Applicable  Date: 11/13/2023          OPEN REPAIR UMBILICAL HERNIA WITH MESH GENERAL PT INST TO HAVE H&P WITH PCP 60 MIN REQ PA ASSIST RMO NMS

## 2023-11-13 NOTE — LETTER
2023       Pavel Greenwood    RE: [id number]  : 1960    Pavel Greenwood has been scheduled for surgery on 2024 at 8:15 AM  at [facility] with Dr Moe Kimball.  The hospital is located at [address] in [place].    Please check in at the Surgery reception desk at 6:15 AM . This is located in the back of the hospital on the East side, just past the Emergency Room entrance.     DO NOT EAT OR DRINK ANYTHING 8 HOURS BEFORE YOUR ARRIVAL TIME.   You may have sips of clear liquids up until 2 hours before your arrival time. If you have been advised to take your medication, please do this early in the morning with just sips of clear liquid.     Hospital regulations require an updated pre-operative examination to be completed within 30 days of the procedure. This can be done by your primary care provider. Please ask them to fax documentation to [phone]. We also recommend you bring a copy with you.     You should shower before your surgery with Hibiclens or Exidine soap.  This can be found at your local pharmacy or you can pick it up from our office for free.  Please call our office if you have any questions.     You will be required to have an Adult (friend or family member) drive you home after your surgery and arrange for an adult to stay with you until the next morning.     You will receive several calls from our staff 3-7 days prior to your scheduled procedure with further details and to answer any questions you may have.    It is sometimes necessary to adjust the surgery schedule due to emergencies and additions to the schedule.  If your surgery is affected by this, we greatly appreciate your flexibility and understanding in this matter    It is best if you call regarding post-operative questions between the hours of 8:00 am & 3:00 pm Monday-Friday, so you have access to the daytime care team that know you best.  Prescription refills are accepted during regular  office hours only.            Please do not bring any Disability or FMLA papers to the hospital.  They need to be either faxed ([phone]), mailed or hand delivered to our office by you or a family member for completion.  Please allow 14 business days to complete paperwork.        If you have questions or concerns, please contact our office at [phone].

## 2023-11-13 NOTE — LETTER
November 13, 2023        RE:   Pavel Greenwood 1960      Dear Colleague,    Thank you for referring your patient, Pavel Greenwood, to [facility]. Please see a copy of my visit note below.    Pavel is a 63 year old male who presents for umbilical hernia evaluation.  The patient has noticed a bulge.  Pain has been present.  Symptoms began 2 years ago. He saw me for the same concern shortly after he initially noted it. He thinks the size is stable but now notes intermittent discomfort with activity.  The patient has not had previous abdominal surgery.  Patient does report that increased activity/lifting causes pain. Employment does not require lifting, he manages a restaurant. Sometimes he will lift a case of beer or keg but not regularly.    Constipation No  DysuriaNo  Cough No  Smoking No    Pt's chart has been reviewed for PMH, PSH, allergies, medications, social and family history.    ROS:  Pulm:  No shortness of breath, dyspnea on exertion, cough, or hemoptysis  CV:  negative  ABD:  See chief complaint  :  Negative  All other systems on 10 point review are negative.    There were no vitals taken for this visit.  Physical exam:  Patient able to get up on table without difficulty.  abdomen is soft without significant tenderness, masses, organomegaly or guarding  bowel sounds are positive and no caput medusa noted.  There is eversion to the entire umbilical skin with a fat-containing and partly reducible hernia present; it is slightly tender when attempting reduction.    Imaging  None    Assesment: umbilical hernia, symptomatic        Plan: The nature of hernias, anatomic considerations, indications and approaches to repair were discussed.  Risks of operative intervention were discussed including infection, bleeding, harm to structures as well as recurrence, which is about 5% per decade of life.  Post operative recuperation and activity limitations were reviewed as well.  The  patient is interested in pursuing repair and we will assist in scheduling this with him.    Ace to follow up with Primary Care provider regarding elevated blood pressure.     Again, thank you for allowing me to participate in the care of your patient.      Sincerely,      Moe Kimball MD

## 2023-11-13 NOTE — PROGRESS NOTES
Pavel is a 63 year old male who presents for umbilical hernia evaluation.  The patient has noticed a bulge.  Pain has been present.  Symptoms began 2 years ago. He saw me for the same concern shortly after he initially noted it. He thinks the size is stable but now notes intermittent discomfort with activity.  The patient has not had previous abdominal surgery.  Patient does report that increased activity/lifting causes pain. Employment does not require lifting, he manages a restaurant. Sometimes he will lift a case of beer or keg but not regularly.    Constipation No  DysuriaNo  Cough No  Smoking No    Pt's chart has been reviewed for PMH, PSH, allergies, medications, social and family history.    ROS:  Pulm:  No shortness of breath, dyspnea on exertion, cough, or hemoptysis  CV:  negative  ABD:  See chief complaint  :  Negative  All other systems on 10 point review are negative.    There were no vitals taken for this visit.  Physical exam:  Patient able to get up on table without difficulty.  abdomen is soft without significant tenderness, masses, organomegaly or guarding  bowel sounds are positive and no caput medusa noted.  There is eversion to the entire umbilical skin with a fat-containing and partly reducible hernia present; it is slightly tender when attempting reduction.    Imaging  None    Assesment: umbilical hernia, symptomatic    Plan: The nature of hernias, anatomic considerations, indications and approaches to repair were discussed.  Risks of operative intervention were discussed including infection, bleeding, harm to structures as well as recurrence, which is about 5% per decade of life.  Post operative recuperation and activity limitations were reviewed as well.  The patient is interested in pursuing repair and we will assist in scheduling this with him.    Moe Kimball MD  11/13/2023 1:41 PM    Please route or send letter to:  Primary Care Provider (PCP)

## 2023-11-13 NOTE — LETTER
November 13, 2023    Pavel Greenwood  [address]   [facility] [address]    We realize with scheduling surgery, one of your first questions is, how much will this cost?  Below we have provided you with the information you will need to receive an estimate for your surgery.    You are scheduled for the following procedure:  OPEN REPAIR UMBILICAL HERNIA WITH MESH      Surgeon:  VASU RENAE MD      Physician Assistant:  Yes    Please make sure to have your insurance card available at the time of calling.    Surgeon & Physician Assistant charges and facility charges for [facility], [facility] or [facility]:    Consumer Price Line at [phone]   -  It is important to note that there may be a Physician Assistant assisting with your surgery.  Please be sure to mention this when calling for the estimate.      If you prefer, you can also request a price estimate online by completing the secure form at:  [email or web address]    Facility Charges at [facility], [facility] or [facility]:  [facility] at [phone]  [facility] at [phone]  [facility] at [phone] or [phone]    Anesthesiologist Charges:  [facility] at [phone]    [facility] - Nurse Anesthetist Charges:  [facility] at [phone]

## 2023-12-06 ENCOUNTER — PATIENT OUTREACH (OUTPATIENT)
Dept: GASTROENTEROLOGY | Facility: CLINIC | Age: 63
End: 2023-12-06
Payer: COMMERCIAL

## 2023-12-06 DIAGNOSIS — Z12.11 SPECIAL SCREENING FOR MALIGNANT NEOPLASMS, COLON: Primary | ICD-10-CM

## 2023-12-06 NOTE — PROGRESS NOTES
"Patient has a history of polyps and surveillance colonoscopy is overdue. Patient meets criteria for CRC high risk standing order protocol.    CRC Screening Colonoscopy Referral Review    Patient meets the inclusion criteria for screening colonoscopy standing order.    Ordering/Referring Provider:  Janet Mayorga MD    BMI: Estimated body mass index is 34.01 kg/m  as calculated from the following:    Height as of 11/13/23: 1.778 m (5' 10\").    Weight as of 11/13/23: 107.5 kg (237 lb).     Sedation:  Does patient have any of the following conditions affecting sedation?  No medical conditions affecting sedation.    Previous Scopes:  Any previous recommendations or follow up needs based on previous scope?  na / No recommendations.    Medical Concerns to Postpone Order:  Does patient have any of the following medical concerns that should postpone/delay colonoscopy referral?  No medical conditions affecting colonoscopy referral.    Final Referral Details:  Based on patient's medical history patient is appropriate for referral order with moderate sedation. If patient's BMI > 50 do not schedule in ASC.  "

## 2024-01-04 DIAGNOSIS — M10.079 IDIOPATHIC GOUT INVOLVING TOE, UNSPECIFIED CHRONICITY, UNSPECIFIED LATERALITY: ICD-10-CM

## 2024-01-04 RX ORDER — ALLOPURINOL 100 MG/1
TABLET ORAL
Qty: 180 TABLET | Refills: 3 | Status: SHIPPED | OUTPATIENT
Start: 2024-01-04 | End: 2024-02-04

## 2024-01-25 ENCOUNTER — OFFICE VISIT (OUTPATIENT)
Dept: URGENT CARE | Facility: URGENT CARE | Age: 64
End: 2024-01-25
Payer: COMMERCIAL

## 2024-01-25 VITALS
TEMPERATURE: 97.6 F | SYSTOLIC BLOOD PRESSURE: 131 MMHG | BODY MASS INDEX: 34.11 KG/M2 | WEIGHT: 237.7 LBS | RESPIRATION RATE: 16 BRPM | OXYGEN SATURATION: 95 % | DIASTOLIC BLOOD PRESSURE: 85 MMHG | HEART RATE: 72 BPM

## 2024-01-25 DIAGNOSIS — H10.32 ACUTE CONJUNCTIVITIS OF LEFT EYE, UNSPECIFIED ACUTE CONJUNCTIVITIS TYPE: Primary | ICD-10-CM

## 2024-01-25 PROCEDURE — 99213 OFFICE O/P EST LOW 20 MIN: CPT | Performed by: PHYSICIAN ASSISTANT

## 2024-01-25 RX ORDER — POLYMYXIN B SULFATE AND TRIMETHOPRIM 1; 10000 MG/ML; [USP'U]/ML
1-2 SOLUTION OPHTHALMIC 4 TIMES DAILY
Qty: 10 ML | Refills: 0 | Status: SHIPPED | OUTPATIENT
Start: 2024-01-25 | End: 2024-02-01

## 2024-01-25 ASSESSMENT — ENCOUNTER SYMPTOMS
SINUS PRESSURE: 0
EYE ITCHING: 1
PALPITATIONS: 0
PHOTOPHOBIA: 0
COUGH: 0
EYE DISCHARGE: 0
SHORTNESS OF BREATH: 0
SINUS PAIN: 0
FATIGUE: 0
CARDIOVASCULAR NEGATIVE: 1
RHINORRHEA: 0
WHEEZING: 0
SORE THROAT: 0
FEVER: 0
CHILLS: 0
EYE REDNESS: 1
EYE PAIN: 1

## 2024-01-25 ASSESSMENT — VISUAL ACUITY: OU: 1

## 2024-01-25 NOTE — PROGRESS NOTES
Crystal Bello is a 63 year old, presenting for the following health issues:  Conjunctivitis (Left eye pain. Onset- Wednesday)    HPI   Eye(s) Problem  Onset/Duration: 2days  Description:   Location: Left  Pain: scratchy, itchy sensation  Redness: YES  Accompanying Signs & Symptoms:  Discharge/mattering: No  Swelling: No  Visual changes: No  Fever: No  Nasal Congestion: No  Bothered by bright lights: No  History:  Trauma: No  Foreign body exposure: No  Wearing contacts: No  Precipitating or alleviating factors: Exposure to pink eye at work  Therapies tried and outcome: warm compresses with minimal relief    Patient Active Problem List   Diagnosis    Adenomatous polyp of colon tubovillous- repeat cscope 2016    Idiopathic gout involving toe, unspecified chronicity, unspecified laterality    Hearing loss    Psoriatic arthritis (H)     Current Outpatient Medications   Medication    allopurinol (ZYLOPRIM) 100 MG tablet    allopurinol (ZYLOPRIM) 300 MG tablet    folic acid (FOLVITE) 1 MG tablet    methotrexate 2.5 MG tablet     No current facility-administered medications for this visit.      No Known Allergies    Review of Systems   Constitutional:  Negative for chills, fatigue and fever.   HENT:  Negative for congestion, rhinorrhea, sinus pressure, sinus pain and sore throat.    Eyes:  Positive for pain, redness and itching. Negative for photophobia, discharge and visual disturbance.   Respiratory:  Negative for cough, shortness of breath and wheezing.    Cardiovascular: Negative.  Negative for chest pain, palpitations and leg swelling.   All other systems reviewed and are negative.          Objective    /85 (BP Location: Left arm, Patient Position: Sitting, Cuff Size: Adult Large)   Pulse 72   Temp 97.6  F (36.4  C) (Tympanic)   Resp 16   Wt 107.8 kg (237 lb 11.2 oz)   SpO2 95%   BMI 34.11 kg/m    Body mass index is 34.11 kg/m .  Physical Exam  Vitals and nursing note reviewed.   Constitutional:        General: He is not in acute distress.     Appearance: Normal appearance. He is well-developed and normal weight. He is not ill-appearing.   HENT:      Head: Normocephalic and atraumatic.      Nose: Nose normal.      Mouth/Throat:      Lips: Pink.      Mouth: Mucous membranes are moist.      Pharynx: Oropharynx is clear. Uvula midline. No pharyngeal swelling, oropharyngeal exudate or posterior oropharyngeal erythema.   Eyes:      General: Lids are normal. Vision grossly intact. Gaze aligned appropriately.         Right eye: No foreign body or discharge.         Left eye: Discharge present.No foreign body.      Extraocular Movements: Extraocular movements intact.      Conjunctiva/sclera:      Right eye: Right conjunctiva is not injected.      Left eye: Left conjunctiva is injected.      Pupils: Pupils are equal, round, and reactive to light.   Cardiovascular:      Rate and Rhythm: Normal rate and regular rhythm.      Heart sounds: Normal heart sounds, S1 normal and S2 normal. No murmur heard.     No gallop.   Pulmonary:      Effort: Pulmonary effort is normal. No accessory muscle usage or respiratory distress.      Breath sounds: Normal breath sounds and air entry. No decreased breath sounds, wheezing, rhonchi or rales.   Chest:      Chest wall: No tenderness.   Musculoskeletal:      Cervical back: Normal range of motion and neck supple.   Lymphadenopathy:      Cervical: No cervical adenopathy.   Skin:     General: Skin is warm and dry.   Neurological:      Mental Status: He is alert and oriented to person, place, and time.   Psychiatric:         Mood and Affect: Mood normal.         Behavior: Behavior normal.         Thought Content: Thought content normal.         Judgment: Judgment normal.             Assessment/Plan:  Acute conjunctivitis of left eye, unspecified acute conjunctivitis type:  Will treat with polytrim eye drops as directed X7days.  Continue with warm compresses and frequent hand washing to prevent  transmission.  Tylenol/motrin as needed for pain/fever.  Recheck in clinic if symptoms worsen or if symptoms do not improve.  -     polymixin b-trimethoprim (POLYTRIM) 53090-2.1 UNIT/ML-% ophthalmic solution; Place 1-2 drops Into the left eye 4 times daily for 7 days        Ariana See COLT Cardona

## 2024-02-04 ENCOUNTER — MYC REFILL (OUTPATIENT)
Dept: PEDIATRICS | Facility: CLINIC | Age: 64
End: 2024-02-04
Payer: COMMERCIAL

## 2024-02-04 DIAGNOSIS — M10.079 IDIOPATHIC GOUT INVOLVING TOE, UNSPECIFIED CHRONICITY, UNSPECIFIED LATERALITY: ICD-10-CM

## 2024-02-06 RX ORDER — ALLOPURINOL 300 MG/1
TABLET ORAL
Qty: 90 TABLET | Refills: 1 | Status: SHIPPED | OUTPATIENT
Start: 2024-02-06

## 2024-02-06 RX ORDER — ALLOPURINOL 100 MG/1
TABLET ORAL
Qty: 180 TABLET | Refills: 3 | Status: SHIPPED | OUTPATIENT
Start: 2024-02-06

## 2024-04-16 NOTE — TELEPHONE ENCOUNTER
Message from [employer]:  Original authorizing provider: Janet Mueller MD    Ace FRENCHRolando Timo would like a refill of the following medications:  colchicine (COLCRYS) 0.6 MG tablet [Janet Mueller MD]    Preferred pharmacy: [facility] [place], [place] - [address]     Comment:     Vermilion Lips Filler Volume In Cc: 0 Additional Anesthesia Volume In Cc: 6 Detail Level: Detailed Procedural Text: The filler was administered to the treatment areas noted above. Number Of Syringes (Required For Inventory): 2 Lot #: [id number] Map Statment: See Attach Map for Complete Details Consent: Written consent obtained. Risks include but not limited to bruising, beading, irregular texture, ulceration, infection, allergic reaction, scar formation, incomplete augmentation, temporary nature, procedural pain. Anesthesia Volume In Cc: 0.5 Include Cannula Information In Note?: No Post-Care Instructions: Patient instructed to apply ice to reduce swelling. Price (Use Numbers Only, No Special Characters Or $): 1800 Filler: Juvederm Voluma XC Expiration Date (Month Year): 2024-10-30 Anesthesia Type: 1% lidocaine with epinephrine

## 2024-09-18 ENCOUNTER — PATIENT OUTREACH (OUTPATIENT)
Dept: CARE COORDINATION | Facility: CLINIC | Age: 64
End: 2024-09-18
Payer: COMMERCIAL

## 2024-10-02 ENCOUNTER — PATIENT OUTREACH (OUTPATIENT)
Dept: CARE COORDINATION | Facility: CLINIC | Age: 64
End: 2024-10-02
Payer: COMMERCIAL

## 2024-10-23 ENCOUNTER — TRANSFERRED RECORDS (OUTPATIENT)
Dept: HEALTH INFORMATION MANAGEMENT | Facility: CLINIC | Age: 64
End: 2024-10-23
Payer: COMMERCIAL

## 2024-12-16 ENCOUNTER — TELEPHONE (OUTPATIENT)
Dept: PEDIATRICS | Facility: CLINIC | Age: 64
End: 2024-12-16
Payer: COMMERCIAL

## 2024-12-16 NOTE — TELEPHONE ENCOUNTER
Pt calls.    He has an appt schedule on Wednesday for a physical.  He was diagnosed with pneumonia on Saturday.  He is not sure if he needs to cancel.  He started antibiotics on Saturday.  He is on benzonatate, amoxicillin, prednisone and an albuterol inhaler.  He is feeling better.    Will forward to Dr. Mayorga.  Will you see him for his physical?  Have him mask? Or do you want him to reschedule?

## 2024-12-17 NOTE — TELEPHONE ENCOUNTER
Called and LVM that pt can be seen as long as he is ok with masking. Informed him that if he does not have mask at home, we can give him one.    Celine Flower, VF

## 2024-12-18 NOTE — TELEPHONE ENCOUNTER
Patient cancelled December 18th appointment via [employer] on December 17th.    Sent [employer] message to see if he would like assistance with rescheduling.    Thank you kindly,  Demarcus WHITE

## 2025-01-11 ENCOUNTER — HEALTH MAINTENANCE LETTER (OUTPATIENT)
Age: 65
End: 2025-01-11

## 2025-03-03 ENCOUNTER — TRANSFERRED RECORDS (OUTPATIENT)
Dept: HEALTH INFORMATION MANAGEMENT | Facility: CLINIC | Age: 65
End: 2025-03-03
Payer: COMMERCIAL

## 2025-04-16 ENCOUNTER — PATIENT OUTREACH (OUTPATIENT)
Dept: CARE COORDINATION | Facility: CLINIC | Age: 65
End: 2025-04-16
Payer: COMMERCIAL